# Patient Record
Sex: FEMALE | Race: WHITE | NOT HISPANIC OR LATINO | Employment: FULL TIME | URBAN - METROPOLITAN AREA
[De-identification: names, ages, dates, MRNs, and addresses within clinical notes are randomized per-mention and may not be internally consistent; named-entity substitution may affect disease eponyms.]

---

## 2017-02-21 ENCOUNTER — OFFICE VISIT (OUTPATIENT)
Dept: FAMILY MEDICINE CLINIC | Facility: CLINIC | Age: 47
End: 2017-02-21

## 2017-02-21 VITALS
HEART RATE: 84 BPM | WEIGHT: 293 LBS | TEMPERATURE: 99.1 F | BODY MASS INDEX: 41.95 KG/M2 | DIASTOLIC BLOOD PRESSURE: 62 MMHG | OXYGEN SATURATION: 97 % | SYSTOLIC BLOOD PRESSURE: 120 MMHG | HEIGHT: 70 IN

## 2017-02-21 DIAGNOSIS — E11.9 TYPE 2 DIABETES MELLITUS WITHOUT COMPLICATION, UNSPECIFIED LONG TERM INSULIN USE STATUS: Primary | ICD-10-CM

## 2017-02-21 DIAGNOSIS — E78.49 OTHER HYPERLIPIDEMIA: ICD-10-CM

## 2017-02-21 LAB — HBA1C MFR BLD: 7.5 %

## 2017-02-21 PROCEDURE — 36416 COLLJ CAPILLARY BLOOD SPEC: CPT | Performed by: FAMILY MEDICINE

## 2017-02-21 PROCEDURE — 83036 HEMOGLOBIN GLYCOSYLATED A1C: CPT | Performed by: FAMILY MEDICINE

## 2017-02-21 PROCEDURE — 99213 OFFICE O/P EST LOW 20 MIN: CPT | Performed by: FAMILY MEDICINE

## 2017-02-21 NOTE — PROGRESS NOTES
Subjective   Yu Sheffield is a 46 y.o. female. CC: diabetes    History of Present Illness  Yu is a 46 year old female who comes in today for her diabetes check.  She has not been following the diet until here recently when she decided that she needed to start taking better care of herself.  She feels fine with no complaints of concerns.  Her sugars at home have generally stayed below 200 and are usually around 150.  Her triglycerides were over 600 last time.  She is taking 3 fish oil capsules a day.      The following portions of the patient's history were reviewed and updated as appropriate: allergies, current medications, past medical history, past social history, past surgical history and problem list.    Review of Systems   Constitutional: Negative.  Negative for fatigue and unexpected weight change.   HENT: Negative.  Negative for congestion.    Respiratory: Positive for cough (mostly in the mornings, smoker). Negative for shortness of breath and wheezing.    Cardiovascular: Negative.  Negative for chest pain, palpitations and leg swelling.   Gastrointestinal: Negative.  Negative for abdominal pain, constipation, diarrhea and nausea.   Genitourinary: Negative.  Negative for hematuria.   Musculoskeletal: Negative.  Negative for arthralgias and back pain.        Pain in the right upper back   Skin: Negative.  Negative for rash.   Neurological: Negative.  Negative for dizziness, light-headedness, numbness and headaches.   Psychiatric/Behavioral: Negative.  Negative for dysphoric mood and sleep disturbance. The patient is not nervous/anxious.        Objective   Physical Exam   Constitutional: She appears well-developed and well-nourished.   Neck: Normal range of motion. Neck supple.   Cardiovascular: Normal rate, regular rhythm and normal heart sounds.    No murmur heard.  Pulmonary/Chest: Effort normal and breath sounds normal. No respiratory distress.   Musculoskeletal: She exhibits tenderness (right upper  back just above the bra strap).   Skin: Skin is warm and dry. No rash noted.   Psychiatric: She has a normal mood and affect. Her behavior is normal.   Nursing note and vitals reviewed.       Vitals:    02/21/17 0946   BP: 120/62   Pulse: 84   Temp: 99.1 °F (37.3 °C)   SpO2: 97%     Assessment/Plan   Problems Addressed this Visit        Cardiovascular and Mediastinum    Hyperlipidemia    Relevant Orders    Comprehensive metabolic panel    Lipid panel      Other Visit Diagnoses     Type 2 diabetes mellitus without complication, unspecified long term insulin use status    -  Primary    Relevant Orders    POC Glycosylated Hemoglobin (Hb A1C) (Completed)        No changes in medications at this time.  Recheck in 3 months.       A1C is 7.5.

## 2017-02-22 LAB
ALBUMIN SERPL-MCNC: 4.3 G/DL (ref 3.5–5.5)
ALBUMIN/GLOB SERPL: 1.5 {RATIO} (ref 1.1–2.5)
ALP SERPL-CCNC: 63 IU/L (ref 39–117)
ALT SERPL-CCNC: 20 IU/L (ref 0–32)
AST SERPL-CCNC: 15 IU/L (ref 0–40)
BILIRUB SERPL-MCNC: 0.4 MG/DL (ref 0–1.2)
BUN SERPL-MCNC: 13 MG/DL (ref 6–24)
BUN/CREAT SERPL: 24 (ref 9–23)
CALCIUM SERPL-MCNC: 9.4 MG/DL (ref 8.7–10.2)
CHLORIDE SERPL-SCNC: 99 MMOL/L (ref 96–106)
CHOLEST SERPL-MCNC: 189 MG/DL (ref 100–199)
CO2 SERPL-SCNC: 21 MMOL/L (ref 18–29)
CREAT SERPL-MCNC: 0.54 MG/DL (ref 0.57–1)
GLOBULIN SER CALC-MCNC: 2.9 G/DL (ref 1.5–4.5)
GLUCOSE SERPL-MCNC: 149 MG/DL (ref 65–99)
HDLC SERPL-MCNC: 35 MG/DL
LDLC SERPL CALC-MCNC: 86 MG/DL (ref 0–99)
POTASSIUM SERPL-SCNC: 4.5 MMOL/L (ref 3.5–5.2)
PROT SERPL-MCNC: 7.2 G/DL (ref 6–8.5)
SODIUM SERPL-SCNC: 139 MMOL/L (ref 134–144)
TRIGL SERPL-MCNC: 339 MG/DL (ref 0–149)
VLDLC SERPL CALC-MCNC: 68 MG/DL (ref 5–40)

## 2017-04-24 RX ORDER — PRAVASTATIN SODIUM 40 MG
TABLET ORAL
Qty: 90 TABLET | Refills: 2 | Status: SHIPPED | OUTPATIENT
Start: 2017-04-24 | End: 2018-01-29 | Stop reason: SDUPTHER

## 2017-05-25 ENCOUNTER — OFFICE VISIT (OUTPATIENT)
Dept: FAMILY MEDICINE CLINIC | Facility: CLINIC | Age: 47
End: 2017-05-25

## 2017-05-25 VITALS
OXYGEN SATURATION: 94 % | BODY MASS INDEX: 41.95 KG/M2 | HEIGHT: 70 IN | DIASTOLIC BLOOD PRESSURE: 72 MMHG | WEIGHT: 293 LBS | HEART RATE: 99 BPM | TEMPERATURE: 97.8 F | SYSTOLIC BLOOD PRESSURE: 132 MMHG

## 2017-05-25 DIAGNOSIS — E11.8 TYPE 2 DIABETES MELLITUS WITH COMPLICATION, UNSPECIFIED LONG TERM INSULIN USE STATUS: Primary | ICD-10-CM

## 2017-05-25 LAB — HBA1C MFR BLD: 8.4 %

## 2017-05-25 PROCEDURE — 36416 COLLJ CAPILLARY BLOOD SPEC: CPT | Performed by: FAMILY MEDICINE

## 2017-05-25 PROCEDURE — 83036 HEMOGLOBIN GLYCOSYLATED A1C: CPT | Performed by: FAMILY MEDICINE

## 2017-05-25 PROCEDURE — 99213 OFFICE O/P EST LOW 20 MIN: CPT | Performed by: FAMILY MEDICINE

## 2017-07-25 DIAGNOSIS — N32.81 OAB (OVERACTIVE BLADDER): ICD-10-CM

## 2017-07-25 RX ORDER — SOLIFENACIN SUCCINATE 10 MG/1
TABLET, FILM COATED ORAL
Qty: 90 TABLET | Refills: 3 | Status: SHIPPED | OUTPATIENT
Start: 2017-07-25 | End: 2018-07-13 | Stop reason: SDUPTHER

## 2017-08-22 ENCOUNTER — OFFICE VISIT (OUTPATIENT)
Dept: FAMILY MEDICINE CLINIC | Facility: CLINIC | Age: 47
End: 2017-08-22

## 2017-08-22 VITALS
BODY MASS INDEX: 41.95 KG/M2 | HEART RATE: 96 BPM | DIASTOLIC BLOOD PRESSURE: 70 MMHG | SYSTOLIC BLOOD PRESSURE: 120 MMHG | HEIGHT: 70 IN | WEIGHT: 293 LBS | TEMPERATURE: 98.7 F | OXYGEN SATURATION: 97 %

## 2017-08-22 DIAGNOSIS — E78.49 OTHER HYPERLIPIDEMIA: ICD-10-CM

## 2017-08-22 DIAGNOSIS — IMO0001 UNCONTROLLED TYPE 2 DIABETES MELLITUS WITHOUT COMPLICATION, WITHOUT LONG-TERM CURRENT USE OF INSULIN: Primary | ICD-10-CM

## 2017-08-22 DIAGNOSIS — R30.0 DYSURIA: ICD-10-CM

## 2017-08-22 LAB
BILIRUB BLD-MCNC: NEGATIVE MG/DL
CLARITY, POC: CLEAR
COLOR UR: YELLOW
GLUCOSE UR STRIP-MCNC: ABNORMAL MG/DL
HBA1C MFR BLD: 8.3 %
KETONES UR QL: NEGATIVE
LEUKOCYTE EST, POC: ABNORMAL
NITRITE UR-MCNC: NEGATIVE MG/ML
PH UR: 5.5 [PH] (ref 5–8)
PROT UR STRIP-MCNC: NEGATIVE MG/DL
RBC # UR STRIP: ABNORMAL /UL
SP GR UR: 1.01 (ref 1–1.03)
UROBILINOGEN UR QL: NORMAL

## 2017-08-22 PROCEDURE — 99213 OFFICE O/P EST LOW 20 MIN: CPT | Performed by: FAMILY MEDICINE

## 2017-08-22 PROCEDURE — 81003 URINALYSIS AUTO W/O SCOPE: CPT | Performed by: FAMILY MEDICINE

## 2017-08-22 PROCEDURE — 36416 COLLJ CAPILLARY BLOOD SPEC: CPT | Performed by: FAMILY MEDICINE

## 2017-08-22 PROCEDURE — 83036 HEMOGLOBIN GLYCOSYLATED A1C: CPT | Performed by: FAMILY MEDICINE

## 2017-08-22 NOTE — PROGRESS NOTES
Subjective   Yu Sheffield is a 47 y.o. female. CC: diabetes    History of Present Illness     Yu is a 47 year old female who comes in today for her diabetes check.  She states that her sugars are still running high but generally stay in the range of 170-180.  Occasionally over 200.  She has lost 5 lbs since her last office visit.  She also needs to have her cholesterol checked today.  Blood pressure has been stable.  Complains of mild dysuria as well as frequency of urine.      The following portions of the patient's history were reviewed and updated as appropriate: allergies, current medications, past medical history, past social history, past surgical history and problem list.    Review of Systems   Constitutional: Negative.  Negative for fatigue and unexpected weight change.   HENT: Negative.  Negative for congestion.    Respiratory: Negative.  Negative for cough, shortness of breath and wheezing.    Gastrointestinal: Negative.  Negative for abdominal pain, constipation, diarrhea and nausea.   Genitourinary: Positive for dysuria and frequency.   Musculoskeletal: Negative.  Negative for arthralgias and back pain.   Skin: Negative.  Negative for rash.   Neurological: Negative.  Negative for dizziness, light-headedness and headaches.   Psychiatric/Behavioral: Negative.  Negative for dysphoric mood and sleep disturbance. The patient is not nervous/anxious.        Objective   Physical Exam   Constitutional: She is oriented to person, place, and time. She appears well-developed and well-nourished.   Cardiovascular: Normal rate, regular rhythm and normal heart sounds.    No murmur heard.  Pulmonary/Chest: Effort normal and breath sounds normal. No respiratory distress.   Neurological: She is alert and oriented to person, place, and time.   Skin: Skin is warm and dry.   Psychiatric: She has a normal mood and affect. Her behavior is normal.   Nursing note and vitals reviewed.    Vitals:    08/22/17 0931   BP: 120/70    Pulse: 96   Temp: 98.7 °F (37.1 °C)   SpO2: 97%     Assessment/Plan   Problems Addressed this Visit        Cardiovascular and Mediastinum    Hyperlipidemia       Endocrine    Uncontrolled type 2 diabetes mellitus without complication, without long-term current use of insulin - Primary    Relevant Medications    Liraglutide 18 MG/3ML solution pen-injector    Insulin Pen Needle 29G X 12MM misc    Other Relevant Orders    POC Glycosylated Hemoglobin (Hb A1C) (Completed)    MicroAlbumin, Urine, Random    Comprehensive metabolic panel    Lipid panel      Other Visit Diagnoses     Dysuria        Relevant Orders    POCT urinalysis dipstick, automated (Completed)    Urine culture (clean catch)            A1C is 8.3.  Started on Victoza and instructed her on the use of the pen.

## 2017-08-24 LAB
ALBUMIN SERPL-MCNC: 4.7 G/DL (ref 3.5–5.2)
ALBUMIN/GLOB SERPL: 1.4 G/DL
ALP SERPL-CCNC: 66 U/L (ref 39–117)
ALT SERPL-CCNC: 32 U/L (ref 1–33)
AST SERPL-CCNC: 22 U/L (ref 1–32)
BACTERIA UR CULT: NORMAL
BACTERIA UR CULT: NORMAL
BILIRUB SERPL-MCNC: 0.4 MG/DL (ref 0.1–1.2)
BUN SERPL-MCNC: 13 MG/DL (ref 6–20)
BUN/CREAT SERPL: 20.6 (ref 7–25)
CALCIUM SERPL-MCNC: 9.9 MG/DL (ref 8.6–10.5)
CHLORIDE SERPL-SCNC: 99 MMOL/L (ref 98–107)
CHOLEST SERPL-MCNC: 183 MG/DL (ref 0–200)
CO2 SERPL-SCNC: 24.3 MMOL/L (ref 22–29)
CREAT SERPL-MCNC: 0.63 MG/DL (ref 0.57–1)
GLOBULIN SER CALC-MCNC: 3.3 GM/DL
GLUCOSE SERPL-MCNC: 158 MG/DL (ref 65–99)
HDLC SERPL-MCNC: 31 MG/DL (ref 40–60)
LDLC SERPL CALC-MCNC: ABNORMAL MG/DL
MICROALBUMIN UR-MCNC: 23.9 UG/ML
POTASSIUM SERPL-SCNC: 4.5 MMOL/L (ref 3.5–5.2)
PROT SERPL-MCNC: 8 G/DL (ref 6–8.5)
SODIUM SERPL-SCNC: 141 MMOL/L (ref 136–145)
TRIGL SERPL-MCNC: 512 MG/DL (ref 0–150)
VLDLC SERPL CALC-MCNC: ABNORMAL MG/DL

## 2017-10-04 DIAGNOSIS — IMO0001 UNCONTROLLED TYPE 2 DIABETES MELLITUS WITHOUT COMPLICATION, WITHOUT LONG-TERM CURRENT USE OF INSULIN: ICD-10-CM

## 2017-10-04 RX ORDER — DAPAGLIFLOZIN 10 MG/1
TABLET, FILM COATED ORAL
Qty: 90 TABLET | Refills: 3 | Status: SHIPPED | OUTPATIENT
Start: 2017-10-04 | End: 2018-09-25 | Stop reason: SDUPTHER

## 2017-11-21 ENCOUNTER — OFFICE VISIT (OUTPATIENT)
Dept: FAMILY MEDICINE CLINIC | Facility: CLINIC | Age: 47
End: 2017-11-21

## 2017-11-21 VITALS
DIASTOLIC BLOOD PRESSURE: 60 MMHG | TEMPERATURE: 99.5 F | HEART RATE: 82 BPM | BODY MASS INDEX: 41.4 KG/M2 | WEIGHT: 289.2 LBS | OXYGEN SATURATION: 97 % | HEIGHT: 70 IN | SYSTOLIC BLOOD PRESSURE: 115 MMHG

## 2017-11-21 DIAGNOSIS — E78.49 OTHER HYPERLIPIDEMIA: ICD-10-CM

## 2017-11-21 DIAGNOSIS — IMO0001 UNCONTROLLED TYPE 2 DIABETES MELLITUS WITHOUT COMPLICATION, WITHOUT LONG-TERM CURRENT USE OF INSULIN: Primary | ICD-10-CM

## 2017-11-21 LAB
ALBUMIN SERPL-MCNC: 4.4 G/DL (ref 3.5–5.2)
ALBUMIN/GLOB SERPL: 1.4 G/DL
ALP SERPL-CCNC: 61 U/L (ref 39–117)
ALT SERPL-CCNC: 24 U/L (ref 1–33)
AST SERPL-CCNC: 15 U/L (ref 1–32)
BILIRUB SERPL-MCNC: 0.3 MG/DL (ref 0.1–1.2)
BUN SERPL-MCNC: 13 MG/DL (ref 6–20)
BUN/CREAT SERPL: 22.4 (ref 7–25)
CALCIUM SERPL-MCNC: 9.5 MG/DL (ref 8.6–10.5)
CHLORIDE SERPL-SCNC: 102 MMOL/L (ref 98–107)
CHOLEST SERPL-MCNC: 183 MG/DL (ref 0–200)
CO2 SERPL-SCNC: 23.8 MMOL/L (ref 22–29)
CREAT SERPL-MCNC: 0.58 MG/DL (ref 0.57–1)
GFR SERPLBLD CREATININE-BSD FMLA CKD-EPI: 111 ML/MIN/1.73
GFR SERPLBLD CREATININE-BSD FMLA CKD-EPI: 134 ML/MIN/1.73
GLOBULIN SER CALC-MCNC: 3.1 GM/DL
GLUCOSE SERPL-MCNC: 124 MG/DL (ref 65–99)
HBA1C MFR BLD: 7.6 %
HDLC SERPL-MCNC: 32 MG/DL (ref 40–60)
LDLC SERPL CALC-MCNC: 89 MG/DL (ref 0–100)
POTASSIUM SERPL-SCNC: 4.4 MMOL/L (ref 3.5–5.2)
PROT SERPL-MCNC: 7.5 G/DL (ref 6–8.5)
SODIUM SERPL-SCNC: 139 MMOL/L (ref 136–145)
TRIGL SERPL-MCNC: 308 MG/DL (ref 0–150)
VLDLC SERPL CALC-MCNC: 61.6 MG/DL (ref 5–40)

## 2017-11-21 PROCEDURE — 83036 HEMOGLOBIN GLYCOSYLATED A1C: CPT | Performed by: FAMILY MEDICINE

## 2017-11-21 PROCEDURE — 36416 COLLJ CAPILLARY BLOOD SPEC: CPT | Performed by: FAMILY MEDICINE

## 2017-11-21 PROCEDURE — 99213 OFFICE O/P EST LOW 20 MIN: CPT | Performed by: FAMILY MEDICINE

## 2017-11-21 RX ORDER — PEN NEEDLE, DIABETIC 29 G X1/2"
NEEDLE, DISPOSABLE MISCELLANEOUS
COMMUNITY
Start: 2017-11-03 | End: 2018-08-07 | Stop reason: SDUPTHER

## 2017-11-21 NOTE — PROGRESS NOTES
Subjective   Yu Sheffield is a 47 y.o. female. CC: diabetes    History of Present Illness   Yu is a 47 year old female who comes in today for her diabetes check.  She states that her sugars are starting to improve with the addition of Victoza.  She is also losing weight since starting on it. She is very happy about that.  Her sugars are generally staying below 200 now.  She is also due to have her cholesterol checked---has hyperlipidemia.  Needs a flu shot but we are out of them today.      The following portions of the patient's history were reviewed and updated as appropriate: allergies, current medications, past medical history, past social history, past surgical history and problem list.    Review of Systems   Constitutional: Negative.  Negative for fatigue and unexpected weight change.   HENT: Negative.  Negative for congestion.    Respiratory: Negative.  Negative for cough, shortness of breath and wheezing.    Cardiovascular: Negative.  Negative for chest pain, palpitations and leg swelling.   Gastrointestinal: Negative.  Negative for abdominal pain, diarrhea and nausea.   Genitourinary: Negative.  Negative for difficulty urinating.   Musculoskeletal: Negative.  Negative for arthralgias and back pain.   Skin: Negative.  Negative for rash.   Neurological: Negative.  Negative for dizziness, light-headedness and headaches.   Psychiatric/Behavioral: Negative.  Negative for dysphoric mood and sleep disturbance. The patient is not nervous/anxious.        Objective   Physical Exam   Constitutional: She is oriented to person, place, and time. She appears well-developed and well-nourished.   Neck: Normal range of motion. Neck supple.   Cardiovascular: Normal rate, regular rhythm and normal heart sounds.    Pulmonary/Chest: Effort normal and breath sounds normal.   Neurological: She is alert and oriented to person, place, and time.   Skin: Skin is warm and dry.   Psychiatric: She has a normal mood and affect. Her  "behavior is normal.   Nursing note and vitals reviewed.    Vitals:    11/21/17 0947   BP: 115/60   Pulse: 82   Temp: 99.5 °F (37.5 °C)   SpO2: 97%   Weight: 289 lb 3.2 oz (131 kg)   Height: 70\" (177.8 cm)     Assessment/Plan   Problems Addressed this Visit        Cardiovascular and Mediastinum    Hyperlipidemia    Relevant Orders    Comprehensive metabolic panel    Lipid panel       Endocrine    Uncontrolled type 2 diabetes mellitus without complication, without long-term current use of insulin - Primary            A1C 7.6.  No change in medication.  She is going to work very hard on her diet and see if she can continue to improve.    Follow up with Dr. Garcia in 3 months.     "

## 2018-01-29 RX ORDER — PRAVASTATIN SODIUM 40 MG
40 TABLET ORAL DAILY
Qty: 90 TABLET | Refills: 2 | Status: SHIPPED | OUTPATIENT
Start: 2018-01-29 | End: 2018-09-25 | Stop reason: SDUPTHER

## 2018-02-27 ENCOUNTER — OFFICE VISIT (OUTPATIENT)
Dept: FAMILY MEDICINE CLINIC | Facility: CLINIC | Age: 48
End: 2018-02-27

## 2018-02-27 VITALS
SYSTOLIC BLOOD PRESSURE: 142 MMHG | BODY MASS INDEX: 41.66 KG/M2 | DIASTOLIC BLOOD PRESSURE: 80 MMHG | HEIGHT: 70 IN | OXYGEN SATURATION: 99 % | WEIGHT: 291 LBS | HEART RATE: 108 BPM

## 2018-02-27 DIAGNOSIS — IMO0001 UNCONTROLLED TYPE 2 DIABETES MELLITUS WITHOUT COMPLICATION, WITHOUT LONG-TERM CURRENT USE OF INSULIN: Primary | ICD-10-CM

## 2018-02-27 DIAGNOSIS — E78.49 OTHER HYPERLIPIDEMIA: ICD-10-CM

## 2018-02-27 DIAGNOSIS — Z23 ENCOUNTER FOR IMMUNIZATION: ICD-10-CM

## 2018-02-27 LAB — HBA1C MFR BLD: 7.6 %

## 2018-02-27 PROCEDURE — 90471 IMMUNIZATION ADMIN: CPT | Performed by: FAMILY MEDICINE

## 2018-02-27 PROCEDURE — 90670 PCV13 VACCINE IM: CPT | Performed by: FAMILY MEDICINE

## 2018-02-27 PROCEDURE — 99214 OFFICE O/P EST MOD 30 MIN: CPT | Performed by: FAMILY MEDICINE

## 2018-02-27 PROCEDURE — 83036 HEMOGLOBIN GLYCOSYLATED A1C: CPT | Performed by: FAMILY MEDICINE

## 2018-02-27 RX ORDER — LISINOPRIL 5 MG/1
5 TABLET ORAL DAILY
Qty: 90 TABLET | Refills: 1 | Status: SHIPPED | OUTPATIENT
Start: 2018-02-27 | End: 2018-08-06 | Stop reason: SDUPTHER

## 2018-02-27 NOTE — PROGRESS NOTES
"Subjective   Yu Sheffield is a 47 y.o. female.  Previously seen in the office by Dr. Salguero.    Chief Complaint   Patient presents with   • Establish Care     diabetes        History of Present Illness    Pt is new to me.  Problems are new to me.  Diabetes F/U    Dietary Habits: in general, an \"unhealthy\" diet, fast food 3 times weekly, breakfast and lunch, drinking soda again, coffee, tea  Diabetic Medications: metformin 500 mg two BID, liraglutide 1.2 mg once daily, farxiga 10 mg daily  BG levels: AM Fasting 150-170, has been in the 200s  Neuropathy: yes, toes have recently been a problem, feel so cold, worse at night  DM symptoms: Polyuria and polydipsia  Foot exam: performed 2/2016, has been to podiatry 3 times in the spring 2017 did shots for bone spur which helped, has a sore on her left foot, during the summer it was fine but now not doing well.  Eye exam: done, 1/26/18 with Jordyn Anthony, ophthomology in [facility], said was good. Will have pt sign to request records.  Pneumovax: has not had, counseled and would like to start series today  Triglycerides in November 2017 total cholesterol 183 triglycerides 308 down from 512 HDL 32 LDL 89.  Last A1c was 7.9%.  Her CMP is otherwise in what mildly elevated blood glucoses have been within normal limits including creatinine and GFR.    The following portions of the patient's history were reviewed and updated as appropriate: allergies, current medications and problem list.    Review of Systems   Constitutional: Negative for activity change and unexpected weight change.   Eyes: Negative for visual disturbance.   Endocrine: Positive for polydipsia and polyuria.       Objective   Blood pressure 142/80, pulse 108, height 177.8 cm (70\"), weight 132 kg (291 lb), SpO2 99 %, not currently breastfeeding.  Physical Exam   Constitutional: She is oriented to person, place, and time.   Eyes: Conjunctivae are normal. No scleral icterus.   Pulmonary/Chest: Effort normal. No respiratory " distress.   Abdominal: Soft. She exhibits no distension.   Musculoskeletal: She exhibits edema.   Trace pitting edema on pretibial surface.    Yu had a diabetic foot exam performed today.   During the foot exam she had a monofilament test performed.    Vascular Status -  Her exam exhibits right foot vasculature normal. Her exam exhibits no right foot edema. Her exam exhibits left foot vasculature normal. Her exam exhibits no left foot edema.   Skin Integrity  -  Her right foot skin is intact.  She has callous right foot, right heel is dry and cracked and right foot warmth.  She has no right foot onychomycosis, no right foot ulcer,  no ingrown toenail on right foot and no right foot blister.    Yu 's left foot skin is intact (medial heel with abrasion, no increased erythema, swelling, drainage, eschar and sloughing, about 2.5 cm in diameter). She has left foot ulcer, callous left foot, left heel dry and cracked and left foot warmth. She has no left foot onychomycosis, no left ingrown toenail and no left foot blister..   Foot Structure and Biomechanics -  Her right foot has no hallux valgus and no hammer toes present. Her left foot has no hallux valgus and no hammer toes.      Neurological: She is alert and oriented to person, place, and time.   Skin:   No firm areas at injection sites   Psychiatric: She has a normal mood and affect. Her behavior is normal.       Assessment/Plan   Yu was seen today for establish care.    Diagnoses and all orders for this visit:    Uncontrolled type 2 diabetes mellitus without complication, without long-term current use of insulin  -     POC Glycosylated Hemoglobin (Hb A1C)    Other hyperlipidemia    Other orders  -     lisinopril (PRINIVIL,ZESTRIL) 5 MG tablet; Take 1 tablet by mouth Daily.    counseled on benefits and risks of ACE-I therapy. Pt to call if having any new symptoms, go to ED if angioedema.    Given handout on diabetes and dining out. Discussed increasing  physical activity, colleagues have started walking at work, she has done that with them before needs to start. Better urinary control with BG control.     Follow up in 3 months

## 2018-05-23 ENCOUNTER — OFFICE VISIT (OUTPATIENT)
Dept: FAMILY MEDICINE CLINIC | Facility: CLINIC | Age: 48
End: 2018-05-23

## 2018-05-23 VITALS
OXYGEN SATURATION: 98 % | WEIGHT: 286 LBS | HEART RATE: 91 BPM | HEIGHT: 70 IN | BODY MASS INDEX: 40.94 KG/M2 | SYSTOLIC BLOOD PRESSURE: 120 MMHG | DIASTOLIC BLOOD PRESSURE: 82 MMHG

## 2018-05-23 DIAGNOSIS — IMO0001 UNCONTROLLED TYPE 2 DIABETES MELLITUS WITHOUT COMPLICATION, WITHOUT LONG-TERM CURRENT USE OF INSULIN: Primary | ICD-10-CM

## 2018-05-23 DIAGNOSIS — E78.49 OTHER HYPERLIPIDEMIA: ICD-10-CM

## 2018-05-23 LAB — HBA1C MFR BLD: 7.5 %

## 2018-05-23 PROCEDURE — 99214 OFFICE O/P EST MOD 30 MIN: CPT | Performed by: FAMILY MEDICINE

## 2018-05-23 PROCEDURE — 83036 HEMOGLOBIN GLYCOSYLATED A1C: CPT | Performed by: FAMILY MEDICINE

## 2018-07-13 DIAGNOSIS — N32.81 OAB (OVERACTIVE BLADDER): ICD-10-CM

## 2018-07-13 RX ORDER — SOLIFENACIN SUCCINATE 10 MG/1
10 TABLET, FILM COATED ORAL DAILY
Qty: 90 TABLET | Refills: 1 | Status: SHIPPED | OUTPATIENT
Start: 2018-07-13 | End: 2018-12-27 | Stop reason: SDUPTHER

## 2018-08-06 RX ORDER — LISINOPRIL 5 MG/1
TABLET ORAL
Qty: 90 TABLET | Refills: 1 | Status: SHIPPED | OUTPATIENT
Start: 2018-08-06 | End: 2019-08-15 | Stop reason: SDUPTHER

## 2018-08-07 DIAGNOSIS — IMO0001 UNCONTROLLED TYPE 2 DIABETES MELLITUS WITHOUT COMPLICATION, WITHOUT LONG-TERM CURRENT USE OF INSULIN: ICD-10-CM

## 2018-08-07 RX ORDER — PEN NEEDLE, DIABETIC 29 G X1/2"
NEEDLE, DISPOSABLE MISCELLANEOUS
Qty: 100 EACH | Refills: 1 | Status: SHIPPED | OUTPATIENT
Start: 2018-08-07 | End: 2018-08-10 | Stop reason: SDUPTHER

## 2018-08-10 DIAGNOSIS — IMO0001 UNCONTROLLED TYPE 2 DIABETES MELLITUS WITHOUT COMPLICATION, WITHOUT LONG-TERM CURRENT USE OF INSULIN: ICD-10-CM

## 2018-08-10 RX ORDER — PEN NEEDLE, DIABETIC 29 G X1/2"
NEEDLE, DISPOSABLE MISCELLANEOUS
Qty: 100 EACH | Refills: 1 | Status: SHIPPED | OUTPATIENT
Start: 2018-08-10 | End: 2020-12-17 | Stop reason: SDUPTHER

## 2018-08-28 ENCOUNTER — OFFICE VISIT (OUTPATIENT)
Dept: FAMILY MEDICINE CLINIC | Facility: CLINIC | Age: 48
End: 2018-08-28

## 2018-08-28 VITALS
WEIGHT: 280 LBS | SYSTOLIC BLOOD PRESSURE: 132 MMHG | HEART RATE: 88 BPM | OXYGEN SATURATION: 99 % | DIASTOLIC BLOOD PRESSURE: 78 MMHG | BODY MASS INDEX: 40.09 KG/M2 | HEIGHT: 70 IN

## 2018-08-28 DIAGNOSIS — I10 ESSENTIAL HYPERTENSION: ICD-10-CM

## 2018-08-28 DIAGNOSIS — IMO0001 UNCONTROLLED TYPE 2 DIABETES MELLITUS WITHOUT COMPLICATION, WITHOUT LONG-TERM CURRENT USE OF INSULIN: Primary | ICD-10-CM

## 2018-08-28 LAB — HBA1C MFR BLD: 7.1 %

## 2018-08-28 PROCEDURE — 83036 HEMOGLOBIN GLYCOSYLATED A1C: CPT | Performed by: FAMILY MEDICINE

## 2018-08-28 PROCEDURE — 99214 OFFICE O/P EST MOD 30 MIN: CPT | Performed by: FAMILY MEDICINE

## 2018-08-28 NOTE — PROGRESS NOTES
"Esdras Sheffield is a 48 y.o. female.     Chief Complaint   Patient presents with   • Diabetes        History of Present Illness    DM follow up  Overall she has fairly good control with last A1c 5/20/18 of 7.5%.  She takes metformin 500 mg tablets and react to lose a.  She is also on Farxiga.  Patient is taking a statin and an ACE inhibitor.  Still eating out a lot. Most often goes to [employer]. She does take her lunch to work but now she is losing her job. Taking sandwich and chicken salad. She will still have insurance. BG in am has been more in the 150s sometimes up to 180. She does not have any hypoglycemia. She had her PCV 13 and diabetic foot exam February of this year. She does not have any sores on her feet or concern about her feet this time.  Eye exams are done in January to Dr. Jordyn Anthony, in [facility] [place] office.     The following portions of the patient's history were reviewed and updated as appropriate: allergies, current medications and problem list.    Review of Systems   Constitutional: Negative for activity change, appetite change, diaphoresis and unexpected weight change.   Respiratory: Negative for shortness of breath.    Cardiovascular: Negative for chest pain and leg swelling.   Endocrine: Positive for polydipsia and polyuria.   Skin: Negative for wound.   Neurological: Negative for light-headedness.       Objective   Blood pressure 132/78, pulse 88, height 177.8 cm (70\"), weight 127 kg (280 lb), SpO2 99 %, not currently breastfeeding.  Physical Exam   Constitutional: She is oriented to person, place, and time. She appears well-nourished. No distress.   Eyes: Conjunctivae are normal. Right eye exhibits no discharge. Left eye exhibits no discharge. No scleral icterus.   Cardiovascular: Normal rate, regular rhythm, normal heart sounds and intact distal pulses.  Exam reveals no gallop and no friction rub.    No murmur heard.  Pulmonary/Chest: Effort normal and breath sounds " normal. No respiratory distress. She has no wheezes.   Musculoskeletal:   Wearing compression socks   Neurological: She is alert and oriented to person, place, and time.   Psychiatric: She has a normal mood and affect. Her behavior is normal.   Vitals reviewed.      Assessment/Plan   Yu was seen today for diabetes.    Diagnoses and all orders for this visit:    Uncontrolled type 2 diabetes mellitus without complication, without long-term current use of insulin (CMS/[facility])  -     POC Glycosylated Hemoglobin (Hb A1C)    Essential hypertension      Both well controlled. Some improvement in A1c today. Taking her lunch encouraged to continue. Going to start exercising when job is done. Continue current meds as is. We will call for eye exam results.

## 2018-09-25 DIAGNOSIS — IMO0001 UNCONTROLLED TYPE 2 DIABETES MELLITUS WITHOUT COMPLICATION, WITHOUT LONG-TERM CURRENT USE OF INSULIN: ICD-10-CM

## 2018-09-25 RX ORDER — PRAVASTATIN SODIUM 40 MG
TABLET ORAL
Qty: 90 TABLET | Refills: 1 | Status: SHIPPED | OUTPATIENT
Start: 2018-09-25 | End: 2020-03-30

## 2018-11-19 ENCOUNTER — OFFICE VISIT (OUTPATIENT)
Dept: FAMILY MEDICINE CLINIC | Facility: CLINIC | Age: 48
End: 2018-11-19

## 2018-11-19 VITALS
SYSTOLIC BLOOD PRESSURE: 132 MMHG | WEIGHT: 272 LBS | OXYGEN SATURATION: 99 % | DIASTOLIC BLOOD PRESSURE: 72 MMHG | HEIGHT: 70 IN | BODY MASS INDEX: 38.94 KG/M2 | HEART RATE: 88 BPM

## 2018-11-19 DIAGNOSIS — E11.65 TYPE 2 DIABETES MELLITUS WITH HYPERGLYCEMIA, WITHOUT LONG-TERM CURRENT USE OF INSULIN (HCC): Primary | ICD-10-CM

## 2018-11-19 DIAGNOSIS — R09.81 NASAL CONGESTION: ICD-10-CM

## 2018-11-19 DIAGNOSIS — Z23 NEED FOR PNEUMOCOCCAL VACCINE: ICD-10-CM

## 2018-11-19 DIAGNOSIS — H61.23 CERUMEN DEBRIS ON TYMPANIC MEMBRANE OF BOTH EARS: ICD-10-CM

## 2018-11-19 DIAGNOSIS — E78.49 OTHER HYPERLIPIDEMIA: ICD-10-CM

## 2018-11-19 DIAGNOSIS — I10 ESSENTIAL HYPERTENSION: ICD-10-CM

## 2018-11-19 DIAGNOSIS — Z23 NEED FOR IMMUNIZATION AGAINST INFLUENZA: ICD-10-CM

## 2018-11-19 LAB
ALBUMIN SERPL-MCNC: 4.6 G/DL (ref 3.5–5.2)
ALBUMIN/GLOB SERPL: 1.4 G/DL
ALP SERPL-CCNC: 69 U/L (ref 39–117)
ALT SERPL-CCNC: 25 U/L (ref 1–33)
AST SERPL-CCNC: 15 U/L (ref 1–32)
BILIRUB SERPL-MCNC: 0.3 MG/DL (ref 0.1–1.2)
BUN SERPL-MCNC: 10 MG/DL (ref 6–20)
BUN/CREAT SERPL: 15.4 (ref 7–25)
CALCIUM SERPL-MCNC: 9.9 MG/DL (ref 8.6–10.5)
CHLORIDE SERPL-SCNC: 98 MMOL/L (ref 98–107)
CHOLEST SERPL-MCNC: 155 MG/DL (ref 0–200)
CO2 SERPL-SCNC: 26 MMOL/L (ref 22–29)
CREAT SERPL-MCNC: 0.65 MG/DL (ref 0.57–1)
GLOBULIN SER CALC-MCNC: 3.4 GM/DL
GLUCOSE SERPL-MCNC: 124 MG/DL (ref 65–99)
HBA1C MFR BLD: 6.98 % (ref 4.8–5.6)
HDLC SERPL-MCNC: 35 MG/DL (ref 40–60)
LDLC SERPL CALC-MCNC: 76 MG/DL (ref 0–100)
POTASSIUM SERPL-SCNC: 4.7 MMOL/L (ref 3.5–5.2)
PROT SERPL-MCNC: 8 G/DL (ref 6–8.5)
SODIUM SERPL-SCNC: 140 MMOL/L (ref 136–145)
TRIGL SERPL-MCNC: 220 MG/DL (ref 0–150)
VLDLC SERPL CALC-MCNC: 44 MG/DL (ref 5–40)

## 2018-11-19 PROCEDURE — 90674 CCIIV4 VAC NO PRSV 0.5 ML IM: CPT | Performed by: FAMILY MEDICINE

## 2018-11-19 PROCEDURE — 90471 IMMUNIZATION ADMIN: CPT | Performed by: FAMILY MEDICINE

## 2018-11-19 PROCEDURE — 99214 OFFICE O/P EST MOD 30 MIN: CPT | Performed by: FAMILY MEDICINE

## 2018-11-19 PROCEDURE — 90472 IMMUNIZATION ADMIN EACH ADD: CPT | Performed by: FAMILY MEDICINE

## 2018-11-19 PROCEDURE — 90732 PPSV23 VACC 2 YRS+ SUBQ/IM: CPT | Performed by: FAMILY MEDICINE

## 2018-11-19 NOTE — PROGRESS NOTES
Subjective    Chief Complaint   Patient presents with   • Diabetes   • Immunizations     flu        Yu Sheffield is a 48 y.o. female.     History of Present Illness   DM  Still making lunch more. Has lost 20 lbs since 2/2018. Has not increased physical activity. Have been running 150 in AM fasting. Lowest 136 and felt good. She takes faxiga, victoza, metformin. Started on victoza last summer. Has eye exam 1/2019. Foot exam due 2/2019, needs flu shot. PPSV next visit.     Congestion  This is the 4th day. She is using day quil and night quil, still lot of congestion. Going to use her neti pot. Trouble breathing through her nose. No ear pain no fever.       The following portions of the patient's history were reviewed and updated as appropriate: allergies, current medications and problem list.    Review of Systems   Constitutional: Negative.    HENT: Positive for congestion.    Eyes: Negative.    Respiratory: Negative.    Cardiovascular: Negative.    Gastrointestinal: Negative.    Endocrine: Negative.    Genitourinary: Negative.    Musculoskeletal: Negative.    Skin: Negative.    Allergic/Immunologic: Negative.    Hematological: Negative.    Psychiatric/Behavioral: Negative.        Objective    Vitals:    11/19/18 0942   BP: 132/72   Pulse: 88   SpO2: 99%       Physical Exam   Constitutional: She is oriented to person, place, and time. She appears well-nourished. No distress.   HENT:   Right Ear: External ear normal.   Left Ear: External ear normal.   Nose: Nose normal.   Mouth/Throat: No oropharyngeal exudate.   Cerumen occlusion bilaterally. Erythema of the posterior oropharynx     Eyes: Conjunctivae are normal. Right eye exhibits no discharge. Left eye exhibits no discharge. No scleral icterus.   Neck: Neck supple. No thyromegaly present.   Cardiovascular: Normal rate, regular rhythm, normal heart sounds and intact distal pulses.   No murmur heard.  Pulmonary/Chest: Effort normal and breath sounds normal. No  respiratory distress. She has no wheezes.   Musculoskeletal: She exhibits no edema.   Lymphadenopathy:     She has no cervical adenopathy.   Neurological: She is alert and oriented to person, place, and time. She exhibits normal muscle tone.   Psychiatric: She has a normal mood and affect. Her behavior is normal.   Vitals reviewed.      Assessment/Plan   Yu was seen today for diabetes and immunizations.    Diagnoses and all orders for this visit:    Type 2 diabetes mellitus with hyperglycemia, without long-term current use of insulin (CMS/[facility])  -     Comprehensive Metabolic Panel  -     Hemoglobin A1c    Need for immunization against influenza  -     Fluzone High Dose =>65Years; Standing  -     Fluzone High Dose =>65Years    Need for pneumococcal vaccine  -     Pneumococcal polysaccharide vaccine 23-valent >= 1yo subcutaneous/IM (PPSV23)    Other hyperlipidemia  -     Lipid Panel    Essential hypertension  -     Comprehensive Metabolic Panel    Cerumen debris on tympanic membrane of both ears    Nasal congestion      A1c has been in the 7s%. She is continuing to work on her diet and lose weight. We will check in on the labs today. No medication changes at this time. Encouraged to continue the diet changes. Really doing well with weight loss. Encouraged to work in exercise to her weekly routine. She voiced understanding and still planning on this 1/2019 when her job ends and she is looking for a new one. She needs PNA23 2/2 diabetes, no 13, has not been one year but will give this today as it is due related to DM.     BP controlled fairly well, at goal for DM, continue current meds.    Due to check in on lipids at one year. Has had good control with the statin. Ensure stability but anticipate better with her weight loss.     Nasal congestion recommended she use nasal saline washing or her neti pot. Also can use mucinex. If no relief from the day-night-quil would recommend dimetapp. If she is not finding  improvement or having worsening over the next week she should call the office.     Madiha  Recommended oil drops and flushes with warm water.

## 2018-12-27 DIAGNOSIS — N32.81 OAB (OVERACTIVE BLADDER): ICD-10-CM

## 2018-12-27 RX ORDER — SOLIFENACIN SUCCINATE 10 MG/1
TABLET, FILM COATED ORAL
Qty: 90 TABLET | Refills: 1 | Status: SHIPPED | OUTPATIENT
Start: 2018-12-27 | End: 2019-08-15 | Stop reason: CLARIF

## 2019-02-11 RX ORDER — LISINOPRIL 5 MG/1
TABLET ORAL
Qty: 90 TABLET | Refills: 1 | Status: SHIPPED | OUTPATIENT
Start: 2019-02-11 | End: 2019-02-20 | Stop reason: SDUPTHER

## 2019-02-20 ENCOUNTER — OFFICE VISIT (OUTPATIENT)
Dept: FAMILY MEDICINE CLINIC | Facility: CLINIC | Age: 49
End: 2019-02-20

## 2019-02-20 VITALS
RESPIRATION RATE: 19 BRPM | HEART RATE: 92 BPM | HEIGHT: 70 IN | OXYGEN SATURATION: 97 % | WEIGHT: 279.8 LBS | DIASTOLIC BLOOD PRESSURE: 72 MMHG | BODY MASS INDEX: 40.06 KG/M2 | SYSTOLIC BLOOD PRESSURE: 134 MMHG

## 2019-02-20 DIAGNOSIS — I10 ESSENTIAL HYPERTENSION: ICD-10-CM

## 2019-02-20 DIAGNOSIS — E11.65 TYPE 2 DIABETES MELLITUS WITH HYPERGLYCEMIA, WITHOUT LONG-TERM CURRENT USE OF INSULIN (HCC): Primary | ICD-10-CM

## 2019-02-20 DIAGNOSIS — E78.49 OTHER HYPERLIPIDEMIA: ICD-10-CM

## 2019-02-20 PROCEDURE — 99214 OFFICE O/P EST MOD 30 MIN: CPT | Performed by: FAMILY MEDICINE

## 2019-02-20 NOTE — PROGRESS NOTES
"Chief Complaint   Patient presents with   • Diabetes     f/u home monitoring 170-180 range on BS       Subjective   Yu Sheffield is a 48 y.o. female.     History of Present Illness   DM  Her BG is 160-180s. Winter is harder. She is still taking her lunch. Has not started exercise. Still working.   [id number] Dr[facility] for eye exam at the beginning of the month said it was good. UTD on vaccines.    HTN  Fairly well controlled.  She is on an ACE inhibitor.  She does not exercise.    HLD  Patient's total cholesterol and LDL are well controlled.  However he she has low HDL and high triglycerides.  She takes 4000 mg of fish oil a day.  She is doing well to continue to take her lunch to work and not eat from restaurants all day.  She does not exercise.    Tobacco abuse counseling  Patient smokes a pack a day.  She quit for 10 months but then started back up when things were stressful.  She would like to quit but has no plans for that right now.    The following portions of the patient's history were reviewed and updated as appropriate: allergies, current medications, past medical history, past social history and problem list.    Review of Systems   Respiratory: Negative.    Endocrine: Negative.    Genitourinary: Negative.    Neurological: Negative.    Psychiatric/Behavioral: Negative.        Vitals:    02/20/19 0852   BP: 134/72   BP Location: Left arm   Patient Position: Sitting   Cuff Size: Adult   Pulse: 92   Resp: 19   SpO2: 97%   Weight: 127 kg (279 lb 12.8 oz)   Height: 177.8 cm (70\")       Objective   Physical Exam   Constitutional: She is oriented to person, place, and time. She appears well-nourished. No distress.   Eyes: Conjunctivae are normal. Right eye exhibits no discharge. Left eye exhibits no discharge. No scleral icterus.   Cardiovascular: Normal rate, regular rhythm, normal heart sounds and intact distal pulses. Exam reveals no gallop and no friction rub.   No murmur heard.  Pulmonary/Chest: " Effort normal and breath sounds normal. No respiratory distress. She has no wheezes.   Musculoskeletal: She exhibits no edema.    Yu had a diabetic foot exam performed today.   During the foot exam she had a monofilament test performed.  Vascular Status -  Her right foot exhibits abnormal foot vasculature . Her right foot exhibits no edema. Her left foot exhibits abnormal foot vasculature . Her left foot exhibits no edema.  Skin Integrity  -  Her right foot skin is not intact. She has callous right foot and right foot warmth.  She has no right foot onychomycosis, no right foot ulcer, no ingrown toenail on right foot, right heel is not dry and cracked and no right foot blister.  Her left foot skin is not intact.She has callous left foot and left foot warmth. She has no left foot onychomycosis, no left foot ulcer, no left ingrown toenail, no left heel dry and cracked and no left foot blister..   Foot Structure and Biomechanics -  Her right foot has no hallux valgus and no hammer toes present. Her left foot has no hallux valgus and no hammer toes.  Neurological: She is alert and oriented to person, place, and time.   Psychiatric: She has a normal mood and affect. Her behavior is normal.   Vitals reviewed.      Assessment/Plan   Yu was seen today for diabetes.    Diagnoses and all orders for this visit:    Type 2 diabetes mellitus with hyperglycemia, without long-term current use of insulin (CMS/[facility])  -     Hemoglobin A1c  -     Basic Metabolic Panel  Has been fairly well controlled with her medications and diet changes.  Encouraged her to continue to work on the diet and incorporate physical activity regularly.  Essential hypertension  -     CBC & Differential  -     Basic Metabolic Panel    Other hyperlipidemia  Discussed exercise.

## 2019-02-25 LAB
BASOPHILS # BLD AUTO: 0.07 10*3/MM3 (ref 0–0.2)
BASOPHILS NFR BLD AUTO: 0.7 % (ref 0–1.5)
BUN SERPL-MCNC: 15 MG/DL (ref 6–20)
BUN/CREAT SERPL: 24.6 (ref 7–25)
CALCIUM SERPL-MCNC: 10.4 MG/DL (ref 8.6–10.5)
CHLORIDE SERPL-SCNC: 103 MMOL/L (ref 98–107)
CO2 SERPL-SCNC: 23.9 MMOL/L (ref 22–29)
CREAT SERPL-MCNC: 0.61 MG/DL (ref 0.57–1)
EOSINOPHIL # BLD AUTO: 0.53 10*3/MM3 (ref 0–0.4)
EOSINOPHIL NFR BLD AUTO: 5.3 % (ref 0.3–6.2)
ERYTHROCYTE [DISTWIDTH] IN BLOOD BY AUTOMATED COUNT: 14.8 % (ref 12.3–15.4)
GLUCOSE SERPL-MCNC: 155 MG/DL (ref 65–99)
HBA1C MFR BLD: 7.3 % (ref 4.8–5.6)
HCT VFR BLD AUTO: 49.8 % (ref 34–46.6)
HGB BLD-MCNC: 16 G/DL (ref 12–15.9)
IMM GRANULOCYTES # BLD AUTO: 0.04 10*3/MM3 (ref 0–0.05)
IMM GRANULOCYTES NFR BLD AUTO: 0.4 % (ref 0–0.5)
LYMPHOCYTES # BLD AUTO: 2.39 10*3/MM3 (ref 0.7–3.1)
LYMPHOCYTES NFR BLD AUTO: 24.1 % (ref 19.6–45.3)
MCH RBC QN AUTO: 33.5 PG (ref 26.6–33)
MCHC RBC AUTO-ENTMCNC: 32.1 G/DL (ref 31.5–35.7)
MCV RBC AUTO: 104.2 FL (ref 79–97)
MONOCYTES # BLD AUTO: 0.64 10*3/MM3 (ref 0.1–0.9)
MONOCYTES NFR BLD AUTO: 6.4 % (ref 5–12)
NEUTROPHILS # BLD AUTO: 6.26 10*3/MM3 (ref 1.4–7)
NEUTROPHILS NFR BLD AUTO: 63.1 % (ref 42.7–76)
NRBC BLD AUTO-RTO: 0 /100 WBC (ref 0–0)
PLATELET # BLD AUTO: 246 10*3/MM3 (ref 140–450)
POTASSIUM SERPL-SCNC: 4.5 MMOL/L (ref 3.5–5.2)
RBC # BLD AUTO: 4.78 10*6/MM3 (ref 3.77–5.28)
SODIUM SERPL-SCNC: 139 MMOL/L (ref 136–145)
WBC # BLD AUTO: 9.93 10*3/MM3 (ref 3.4–10.8)

## 2019-03-18 DIAGNOSIS — IMO0001 UNCONTROLLED TYPE 2 DIABETES MELLITUS WITHOUT COMPLICATION, WITHOUT LONG-TERM CURRENT USE OF INSULIN: ICD-10-CM

## 2019-03-18 RX ORDER — DAPAGLIFLOZIN 10 MG/1
TABLET, FILM COATED ORAL
Qty: 90 TABLET | Refills: 0 | Status: SHIPPED | OUTPATIENT
Start: 2019-03-18 | End: 2019-12-02 | Stop reason: SDUPTHER

## 2019-04-15 DIAGNOSIS — IMO0001 UNCONTROLLED TYPE 2 DIABETES MELLITUS WITHOUT COMPLICATION, WITHOUT LONG-TERM CURRENT USE OF INSULIN: ICD-10-CM

## 2019-04-15 RX ORDER — PRAVASTATIN SODIUM 40 MG
TABLET ORAL
Qty: 90 TABLET | Refills: 1 | Status: SHIPPED | OUTPATIENT
Start: 2019-04-15 | End: 2019-05-24 | Stop reason: SDUPTHER

## 2019-05-24 ENCOUNTER — OFFICE VISIT (OUTPATIENT)
Dept: FAMILY MEDICINE CLINIC | Facility: CLINIC | Age: 49
End: 2019-05-24

## 2019-05-24 VITALS
TEMPERATURE: 98.9 F | SYSTOLIC BLOOD PRESSURE: 122 MMHG | OXYGEN SATURATION: 96 % | DIASTOLIC BLOOD PRESSURE: 72 MMHG | BODY MASS INDEX: 40.27 KG/M2 | WEIGHT: 281.3 LBS | HEART RATE: 93 BPM | RESPIRATION RATE: 18 BRPM | HEIGHT: 70 IN

## 2019-05-24 DIAGNOSIS — Z12.39 BREAST CANCER SCREENING: ICD-10-CM

## 2019-05-24 DIAGNOSIS — Z12.4 CERVICAL CANCER SCREENING: ICD-10-CM

## 2019-05-24 DIAGNOSIS — E11.65 TYPE 2 DIABETES MELLITUS WITH HYPERGLYCEMIA, WITHOUT LONG-TERM CURRENT USE OF INSULIN (HCC): ICD-10-CM

## 2019-05-24 DIAGNOSIS — Z00.00 ANNUAL PHYSICAL EXAM: Primary | ICD-10-CM

## 2019-05-24 PROCEDURE — 99396 PREV VISIT EST AGE 40-64: CPT | Performed by: FAMILY MEDICINE

## 2019-05-24 NOTE — PROGRESS NOTES
"Chief Complaint   Patient presents with   • Annual Exam   • Gynecologic Exam       Subjective   Yu Sheffield is a 48 y.o. female.     History of Present Illness   Colonoscopy she has never had one  Mammogram used to have them regular with the [facility] for screening. We think the last one was 2013. She does self exams she has no concerns.  Diet no special diet.  She does continue to take her lunches to work and prepare food.  Her sugars not tested in the last 1 week because her dad had pneumonia and was having some elevated sugars so she used hers strips and testing supplies to test his blood sugar.  Prior to that high she is always 180 usually lower than that.  Exercise she has not incorporated exercise into her regular schedule.  Bone density her LMP was year ago. No bleeding, no d/c. She does have vaginal itching. Wakes in the middle of the night with that. More so on the inner thigh. She is not sexually active.     The following portions of the patient's history were reviewed and updated as appropriate: allergies, current medications, past family history, past medical history, past social history, past surgical history and problem list.    Review of Systems   Constitutional: Negative for activity change, appetite change and unexpected weight change.   Respiratory: Negative for shortness of breath.    Cardiovascular: Negative for chest pain and leg swelling.   Gastrointestinal: Negative for blood in stool, constipation and diarrhea.       Vitals:    05/24/19 1108   BP: 122/72   BP Location: Left arm   Patient Position: Sitting   Cuff Size: Adult   Pulse: 93   Resp: 18   Temp: 98.9 °F (37.2 °C)   TempSrc: Oral   SpO2: 96%   Weight: 128 kg (281 lb 4.8 oz)   Height: 177.8 cm (70\")       Objective   Physical Exam   Constitutional: She is oriented to person, place, and time. She appears well-nourished. No distress.   HENT:   Right Ear: External ear normal.   Left Ear: External ear normal.   Nose: Nose " normal.   Mouth/Throat: Oropharynx is clear and moist. No oropharyngeal exudate.   Bilateral ear canals and tympanic membranes appear normal.   Eyes: Conjunctivae and EOM are normal. Right eye exhibits no discharge. Left eye exhibits no discharge. No scleral icterus.   Neck: Neck supple. No thyromegaly present.   Cardiovascular: Normal rate, regular rhythm, normal heart sounds and intact distal pulses. Exam reveals no gallop and no friction rub.   No murmur heard.  Pulmonary/Chest: Effort normal and breath sounds normal. No respiratory distress. She has no wheezes. She has no rales. She exhibits no tenderness. Right breast exhibits no inverted nipple, no mass, no nipple discharge, no skin change and no tenderness. Left breast exhibits no inverted nipple, no mass, no nipple discharge, no skin change and no tenderness.       Abdominal: Soft. Bowel sounds are normal. She exhibits no distension and no mass. There is no tenderness. There is no guarding.   Genitourinary: Vagina normal. No vaginal discharge found.   Genitourinary Comments: Cervix visualized but also was faced laterally to the patient's left.  Difficult to get into view and sample.  Related to body habitus the bimanual exam was less reliable but she did not have any tenderness or palpated masses or bulkiness.   Musculoskeletal: She exhibits no edema or deformity.   Lymphadenopathy:     She has no cervical adenopathy.   Neurological: She is alert and oriented to person, place, and time. She exhibits normal muscle tone. Coordination normal.   Skin: Skin is warm and dry.   Psychiatric: She has a normal mood and affect. Her behavior is normal.   Vitals reviewed.  This was my first breast exam on the patient.  On her right breast most medial margin near the sternum there were 2 palpable firm masses.  The most central one was a little bit smaller about 1 cm in the 1 lateral to that was a little bit lower and incorporated like to masses and was more like 2  raul.    Assessment/Plan   Yu was seen today for annual exam and gynecologic exam.    Diagnoses and all orders for this visit:    Annual physical exam  -     IGP, cobasHPV, rfx16/18    Cervical cancer screening  -     IGP, cobasHPV, rfx16/18  -     Pap IG, HPV-hr; Future    Type 2 diabetes mellitus with hyperglycemia, without long-term current use of insulin (CMS/[facility])  -     Hemoglobin A1c  -     Comprehensive Metabolic Panel  -     Lipid Panel  -     MicroAlbumin, Urine, Random - Urine, Clean Catch    Breast cancer screening    Other orders  -     Cancel: CBC & Differential    Patient is due for annual exam and needs to go to the dentist which she says she will schedule.  She also needs a mammogram which she wants to do with the [facility].  I offered to set up a mammogram for her but she reported she did not want me to she was going to do it herself.  The palpable masses on the right breast were reported by patient to have been there for years and unchanged.  I encouraged her to get the mammogram because she is 6 years behind schedule and be good to take a look.  Patient voiced understanding and should she will do this.  Concern for Pap smear sampling to be more vaginal than cervical given the difficulty of the exam and the positioning of the cervix.

## 2019-05-25 LAB
ALBUMIN SERPL-MCNC: 4.6 G/DL (ref 3.5–5.2)
ALBUMIN/GLOB SERPL: 1.4 G/DL
ALP SERPL-CCNC: 67 U/L (ref 39–117)
ALT SERPL-CCNC: 20 U/L (ref 1–33)
AST SERPL-CCNC: 14 U/L (ref 1–32)
BILIRUB SERPL-MCNC: 0.5 MG/DL (ref 0.2–1.2)
BUN SERPL-MCNC: 12 MG/DL (ref 6–20)
BUN/CREAT SERPL: 21.4 (ref 7–25)
CALCIUM SERPL-MCNC: 10 MG/DL (ref 8.6–10.5)
CHLORIDE SERPL-SCNC: 99 MMOL/L (ref 98–107)
CHOLEST SERPL-MCNC: 179 MG/DL (ref 0–200)
CO2 SERPL-SCNC: 22.9 MMOL/L (ref 22–29)
CREAT SERPL-MCNC: 0.56 MG/DL (ref 0.57–1)
GLOBULIN SER CALC-MCNC: 3.2 GM/DL
GLUCOSE SERPL-MCNC: 118 MG/DL (ref 65–99)
HBA1C MFR BLD: 7.1 % (ref 4.8–5.6)
HDLC SERPL-MCNC: 38 MG/DL (ref 40–60)
LDLC SERPL CALC-MCNC: 71 MG/DL (ref 0–100)
MICROALBUMIN UR-MCNC: 4.2 UG/ML
POTASSIUM SERPL-SCNC: 4.5 MMOL/L (ref 3.5–5.2)
PROT SERPL-MCNC: 7.8 G/DL (ref 6–8.5)
SODIUM SERPL-SCNC: 140 MMOL/L (ref 136–145)
TRIGL SERPL-MCNC: 350 MG/DL (ref 0–150)
VLDLC SERPL CALC-MCNC: 70 MG/DL

## 2019-05-29 LAB
CYTOLOGIST CVX/VAG CYTO: NORMAL
CYTOLOGY CVX/VAG DOC CYTO: NORMAL
CYTOLOGY CVX/VAG DOC THIN PREP: NORMAL
DX ICD CODE: NORMAL
HIV 1 & 2 AB SER-IMP: NORMAL
HPV I/H RISK 4 DNA CVX QL PROBE+SIG AMP: NEGATIVE
OTHER STN SPEC: NORMAL
STAT OF ADQ CVX/VAG CYTO-IMP: NORMAL

## 2019-06-10 DIAGNOSIS — IMO0001 UNCONTROLLED TYPE 2 DIABETES MELLITUS WITHOUT COMPLICATION, WITHOUT LONG-TERM CURRENT USE OF INSULIN: ICD-10-CM

## 2019-06-10 RX ORDER — DAPAGLIFLOZIN 10 MG/1
TABLET, FILM COATED ORAL
Qty: 90 TABLET | Refills: 0 | Status: SHIPPED | OUTPATIENT
Start: 2019-06-10 | End: 2019-08-15 | Stop reason: SDUPTHER

## 2019-06-28 DIAGNOSIS — N32.81 OAB (OVERACTIVE BLADDER): ICD-10-CM

## 2019-06-28 RX ORDER — SOLIFENACIN SUCCINATE 10 MG/1
TABLET, FILM COATED ORAL
Qty: 90 TABLET | Refills: 1 | Status: SHIPPED | OUTPATIENT
Start: 2019-06-28 | End: 2019-08-15 | Stop reason: CLARIF

## 2019-07-22 RX ORDER — LISINOPRIL 5 MG/1
TABLET ORAL
Qty: 90 TABLET | Refills: 1 | Status: SHIPPED | OUTPATIENT
Start: 2019-07-22 | End: 2020-01-27

## 2019-07-25 DIAGNOSIS — IMO0001 UNCONTROLLED TYPE 2 DIABETES MELLITUS WITHOUT COMPLICATION, WITHOUT LONG-TERM CURRENT USE OF INSULIN: ICD-10-CM

## 2019-08-15 ENCOUNTER — RESULTS ENCOUNTER (OUTPATIENT)
Dept: FAMILY MEDICINE CLINIC | Facility: CLINIC | Age: 49
End: 2019-08-15

## 2019-08-15 ENCOUNTER — OFFICE VISIT (OUTPATIENT)
Dept: FAMILY MEDICINE CLINIC | Facility: CLINIC | Age: 49
End: 2019-08-15

## 2019-08-15 VITALS
OXYGEN SATURATION: 97 % | WEIGHT: 282.4 LBS | BODY MASS INDEX: 40.43 KG/M2 | RESPIRATION RATE: 16 BRPM | TEMPERATURE: 98.8 F | DIASTOLIC BLOOD PRESSURE: 72 MMHG | SYSTOLIC BLOOD PRESSURE: 126 MMHG | HEIGHT: 70 IN | HEART RATE: 96 BPM

## 2019-08-15 DIAGNOSIS — Z12.11 COLON CANCER SCREENING: ICD-10-CM

## 2019-08-15 DIAGNOSIS — I10 ESSENTIAL HYPERTENSION: ICD-10-CM

## 2019-08-15 DIAGNOSIS — E11.65 TYPE 2 DIABETES MELLITUS WITH HYPERGLYCEMIA, WITHOUT LONG-TERM CURRENT USE OF INSULIN (HCC): Primary | ICD-10-CM

## 2019-08-15 PROCEDURE — 99213 OFFICE O/P EST LOW 20 MIN: CPT | Performed by: FAMILY MEDICINE

## 2019-08-15 RX ORDER — DICLOFENAC SODIUM 75 MG/1
75 TABLET, DELAYED RELEASE ORAL 2 TIMES DAILY
COMMUNITY
Start: 2019-08-13

## 2019-08-15 RX ORDER — SOLIFENACIN SUCCINATE 10 MG/1
TABLET, FILM COATED ORAL
COMMUNITY
Start: 2019-06-28 | End: 2019-12-23

## 2019-08-16 LAB
BUN SERPL-MCNC: 16 MG/DL (ref 6–20)
BUN/CREAT SERPL: 26.2 (ref 7–25)
CALCIUM SERPL-MCNC: 9.5 MG/DL (ref 8.6–10.5)
CHLORIDE SERPL-SCNC: 100 MMOL/L (ref 98–107)
CO2 SERPL-SCNC: 26.4 MMOL/L (ref 22–29)
CREAT SERPL-MCNC: 0.61 MG/DL (ref 0.57–1)
GLUCOSE SERPL-MCNC: 136 MG/DL (ref 65–99)
HBA1C MFR BLD: 7.1 % (ref 4.8–5.6)
POTASSIUM SERPL-SCNC: 5.2 MMOL/L (ref 3.5–5.2)
SODIUM SERPL-SCNC: 140 MMOL/L (ref 136–145)

## 2019-09-09 DIAGNOSIS — IMO0001 UNCONTROLLED TYPE 2 DIABETES MELLITUS WITHOUT COMPLICATION, WITHOUT LONG-TERM CURRENT USE OF INSULIN: ICD-10-CM

## 2019-09-09 RX ORDER — PRAVASTATIN SODIUM 40 MG
TABLET ORAL
Qty: 90 TABLET | Refills: 1 | Status: SHIPPED | OUTPATIENT
Start: 2019-09-09 | End: 2019-12-02 | Stop reason: SDUPTHER

## 2019-09-09 RX ORDER — DAPAGLIFLOZIN 10 MG/1
TABLET, FILM COATED ORAL
Qty: 90 TABLET | Refills: 0 | Status: SHIPPED | OUTPATIENT
Start: 2019-09-09 | End: 2019-12-02 | Stop reason: SDUPTHER

## 2019-12-02 ENCOUNTER — OFFICE VISIT (OUTPATIENT)
Dept: FAMILY MEDICINE CLINIC | Facility: CLINIC | Age: 49
End: 2019-12-02

## 2019-12-02 VITALS
HEIGHT: 70 IN | BODY MASS INDEX: 39.71 KG/M2 | HEART RATE: 93 BPM | DIASTOLIC BLOOD PRESSURE: 68 MMHG | WEIGHT: 277.4 LBS | RESPIRATION RATE: 16 BRPM | SYSTOLIC BLOOD PRESSURE: 116 MMHG | OXYGEN SATURATION: 98 % | TEMPERATURE: 98.9 F

## 2019-12-02 DIAGNOSIS — E11.65 TYPE 2 DIABETES MELLITUS WITH HYPERGLYCEMIA, WITHOUT LONG-TERM CURRENT USE OF INSULIN (HCC): Primary | ICD-10-CM

## 2019-12-02 DIAGNOSIS — I10 ESSENTIAL HYPERTENSION: ICD-10-CM

## 2019-12-02 PROCEDURE — 90471 IMMUNIZATION ADMIN: CPT | Performed by: FAMILY MEDICINE

## 2019-12-02 PROCEDURE — 90674 CCIIV4 VAC NO PRSV 0.5 ML IM: CPT | Performed by: FAMILY MEDICINE

## 2019-12-02 PROCEDURE — 99213 OFFICE O/P EST LOW 20 MIN: CPT | Performed by: FAMILY MEDICINE

## 2019-12-02 RX ORDER — ALBUTEROL SULFATE 90 UG/1
AEROSOL, METERED RESPIRATORY (INHALATION)
COMMUNITY
Start: 2019-09-05 | End: 2020-03-06

## 2019-12-02 NOTE — PROGRESS NOTES
"Chief Complaint   Patient presents with   • Diabetes   • Flu Vaccine       Subjective   Yu Sheffield is a 49 y.o. female.     History of Present Illness   Last A1c was 7.1% 8/2019.  meds taking faxiga, metformin  Morning fasting 140-150 usually. No hypoglycemia.   She is walking a lot when she is traveling. Not as much now.   Her eye exam is in 1/2020. She has in 1/2019 from Dr. Araya.   She is taking her lunch instead of getting restaurant food.     The following portions of the patient's history were reviewed and updated as appropriate: allergies, current medications, past medical history, past social history and problem list.    Review of Systems   Respiratory: Negative.    Cardiovascular: Negative.    Endocrine: Negative.    Neurological: Negative.        /68 (BP Location: Left arm, Patient Position: Sitting, Cuff Size: Adult)   Pulse 93   Temp 98.9 °F (37.2 °C) (Oral)   Resp 16   Ht 177.8 cm (70\")   Wt 126 kg (277 lb 6.4 oz)   LMP  (LMP Unknown)   SpO2 98%   Breastfeeding? No   BMI 39.80 kg/m²       Objective   Physical Exam   Constitutional: She is oriented to person, place, and time. She appears well-nourished. No distress.   Eyes: Conjunctivae are normal. Right eye exhibits no discharge. Left eye exhibits no discharge. No scleral icterus.   Cardiovascular: Normal rate, regular rhythm, normal heart sounds and intact distal pulses. Exam reveals no gallop and no friction rub.   No murmur heard.  Pulmonary/Chest: Effort normal and breath sounds normal. No respiratory distress. She has no wheezes.   Musculoskeletal: She exhibits no edema.   Neurological: She is alert and oriented to person, place, and time.   Psychiatric: She has a normal mood and affect. Her behavior is normal.   Vitals reviewed.      Assessment/Plan   Yu was seen today for diabetes and flu vaccine.    Diagnoses and all orders for this visit:    Type 2 diabetes mellitus with hyperglycemia, without long-term current use of " insulin (CMS/[facility])  -     Dapagliflozin Propanediol (FARXIGA) 10 MG tablet; Take 10 mg by mouth Daily.  -     Comprehensive Metabolic Panel  -     CBC & Differential  -     Hemoglobin A1c    Essential hypertension  -     CBC & Differential    Other orders  -     Fluarix/Fluzone/Afluria Quad>6 Months      She has lost weight.   Blood pressure good.   We talked about her smoking but she says she likes it and she is not ready to quit.     Continue food prep  Labs today  No med changes until lab review.

## 2019-12-03 LAB
ALBUMIN SERPL-MCNC: 4.6 G/DL (ref 3.5–5.2)
ALBUMIN/GLOB SERPL: 1.7 G/DL
ALP SERPL-CCNC: 58 U/L (ref 39–117)
ALT SERPL-CCNC: 22 U/L (ref 1–33)
AST SERPL-CCNC: 11 U/L (ref 1–32)
BASOPHILS # BLD AUTO: 0.08 10*3/MM3 (ref 0–0.2)
BASOPHILS NFR BLD AUTO: 0.9 % (ref 0–1.5)
BILIRUB SERPL-MCNC: 0.5 MG/DL (ref 0.2–1.2)
BUN SERPL-MCNC: 16 MG/DL (ref 6–20)
BUN/CREAT SERPL: 23.2 (ref 7–25)
CALCIUM SERPL-MCNC: 9.3 MG/DL (ref 8.6–10.5)
CHLORIDE SERPL-SCNC: 101 MMOL/L (ref 98–107)
CO2 SERPL-SCNC: 23.6 MMOL/L (ref 22–29)
CREAT SERPL-MCNC: 0.69 MG/DL (ref 0.57–1)
EOSINOPHIL # BLD AUTO: 0.71 10*3/MM3 (ref 0–0.4)
EOSINOPHIL NFR BLD AUTO: 8 % (ref 0.3–6.2)
ERYTHROCYTE [DISTWIDTH] IN BLOOD BY AUTOMATED COUNT: 14.6 % (ref 12.3–15.4)
GLOBULIN SER CALC-MCNC: 2.7 GM/DL
GLUCOSE SERPL-MCNC: 114 MG/DL (ref 65–99)
HBA1C MFR BLD: 6.8 % (ref 4.8–5.6)
HCT VFR BLD AUTO: 44.5 % (ref 34–46.6)
HGB BLD-MCNC: 15.1 G/DL (ref 12–15.9)
IMM GRANULOCYTES # BLD AUTO: 0.02 10*3/MM3 (ref 0–0.05)
IMM GRANULOCYTES NFR BLD AUTO: 0.2 % (ref 0–0.5)
LYMPHOCYTES # BLD AUTO: 2.6 10*3/MM3 (ref 0.7–3.1)
LYMPHOCYTES NFR BLD AUTO: 29.1 % (ref 19.6–45.3)
MCH RBC QN AUTO: 33.9 PG (ref 26.6–33)
MCHC RBC AUTO-ENTMCNC: 33.9 G/DL (ref 31.5–35.7)
MCV RBC AUTO: 99.8 FL (ref 79–97)
MONOCYTES # BLD AUTO: 0.55 10*3/MM3 (ref 0.1–0.9)
MONOCYTES NFR BLD AUTO: 6.2 % (ref 5–12)
NEUTROPHILS # BLD AUTO: 4.96 10*3/MM3 (ref 1.7–7)
NEUTROPHILS NFR BLD AUTO: 55.6 % (ref 42.7–76)
NRBC BLD AUTO-RTO: 0 /100 WBC (ref 0–0.2)
PLATELET # BLD AUTO: 253 10*3/MM3 (ref 140–450)
POTASSIUM SERPL-SCNC: 4.9 MMOL/L (ref 3.5–5.2)
PROT SERPL-MCNC: 7.3 G/DL (ref 6–8.5)
RBC # BLD AUTO: 4.46 10*6/MM3 (ref 3.77–5.28)
SODIUM SERPL-SCNC: 139 MMOL/L (ref 136–145)
WBC # BLD AUTO: 8.92 10*3/MM3 (ref 3.4–10.8)

## 2019-12-23 RX ORDER — SOLIFENACIN SUCCINATE 10 MG/1
TABLET, FILM COATED ORAL
Qty: 90 TABLET | Refills: 1 | Status: SHIPPED | OUTPATIENT
Start: 2019-12-23 | End: 2020-06-13

## 2020-01-27 RX ORDER — LISINOPRIL 5 MG/1
TABLET ORAL
Qty: 90 TABLET | Refills: 1 | Status: SHIPPED | OUTPATIENT
Start: 2020-01-27 | End: 2020-07-27

## 2020-03-06 ENCOUNTER — OFFICE VISIT (OUTPATIENT)
Dept: FAMILY MEDICINE CLINIC | Facility: CLINIC | Age: 50
End: 2020-03-06

## 2020-03-06 VITALS
HEART RATE: 99 BPM | HEIGHT: 70 IN | OXYGEN SATURATION: 98 % | TEMPERATURE: 98.4 F | RESPIRATION RATE: 18 BRPM | BODY MASS INDEX: 39.34 KG/M2 | SYSTOLIC BLOOD PRESSURE: 128 MMHG | WEIGHT: 274.8 LBS | DIASTOLIC BLOOD PRESSURE: 72 MMHG

## 2020-03-06 DIAGNOSIS — E11.65 TYPE 2 DIABETES MELLITUS WITH HYPERGLYCEMIA, WITHOUT LONG-TERM CURRENT USE OF INSULIN (HCC): Primary | ICD-10-CM

## 2020-03-06 DIAGNOSIS — E78.49 OTHER HYPERLIPIDEMIA: ICD-10-CM

## 2020-03-06 DIAGNOSIS — I10 ESSENTIAL HYPERTENSION: ICD-10-CM

## 2020-03-06 PROCEDURE — 99214 OFFICE O/P EST MOD 30 MIN: CPT | Performed by: FAMILY MEDICINE

## 2020-03-06 NOTE — PROGRESS NOTES
"Chief Complaint   Patient presents with   • Diabetes     3 mth follow    • Med Management     DM foot screen        Subjective   Yu Sheffield is a 49 y.o. female.     History of Present Illness   DM  Sugars says they are about the same 150-160.  She is on a few meds. Taking 100 mg of metformin BID, victoza  Daily and farxiga. She needs a refill of the farxiga and says she has much worse insurance this year so she is concerned how much this is going to cost.  Due for foot screen.   She did received her PPSV 23 in 11/2018  She had her eye exam last month    HTN  Looks great on ACE inhibitor. Just low dose.   She does not exercise but prepares her own meals including lunch.     HLD  On a statin, low potency. She is due for checking in on labs.     Has not yet gotten her mammogram.   She is still smoking.     The following portions of the patient's history were reviewed and updated as appropriate: allergies, current medications, past medical history, past social history and problem list.    Review of Systems   Respiratory: Negative.    Cardiovascular: Negative.    Neurological: Positive for headaches.        State is occurring more frequently    said years ago she had migraines and had not had any in long time. Had one this last month and now little headaches she takes excedrin or excedrin migraine, helps a lot. She does not use the medication every day. She just uses advil for regular headache. She does this infrequently, twice per month. No sinus pressure or allergy stuff. Says that weather changes are triggers for her migraines in addition to her cycle.     /72 (BP Location: Left arm, Patient Position: Sitting, Cuff Size: Adult)   Pulse 99   Temp 98.4 °F (36.9 °C) (Oral)   Resp 18   Ht 177.8 cm (70\")   Wt 125 kg (274 lb 12.8 oz)   LMP  (LMP Unknown)   SpO2 98%   Breastfeeding No   BMI 39.43 kg/m²       Objective   Physical Exam   Constitutional: She is oriented to person, place, and time. She appears " well-nourished. No distress.   HENT:   Right Ear: External ear normal.   Left Ear: External ear normal.   Nose: Nose normal.   Mouth/Throat: Oropharynx is clear and moist.   TMs and canals are clear, normal.   Eyes: Conjunctivae are normal. Right eye exhibits no discharge. Left eye exhibits no discharge. No scleral icterus.   Neck: Neck supple. No thyromegaly present.   Cardiovascular: Normal rate, regular rhythm, normal heart sounds and intact distal pulses.   No murmur heard.  Pulmonary/Chest: Effort normal and breath sounds normal. No respiratory distress. She has no wheezes.   Musculoskeletal: She exhibits no edema.    Yu had a diabetic foot exam performed today.   During the foot exam she had a monofilament test performed.  Vascular Status -  Her right foot exhibits normal foot vasculature  and no edema. Her left foot exhibits normal foot vasculature  and no edema.  Skin Integrity  -  Her right foot skin is intact. She has callous right foot and right heel is dry and cracked.  She has no right foot onychomycosis, no right foot ulcer, no ingrown toenail on right foot, no right foot warmth and no right foot blister.Her left foot skin is intact.She has callous left foot and left heel dry and cracked. She has no left foot onychomycosis, no left foot ulcer, no left ingrown toenail, no left foot warmth and no left foot blister..   Foot Structure and Biomechanics -  Her right foot has no hallux valgus and no hammer toes present. Her left foot has no hallux valgus and no hammer toes.  Lymphadenopathy:     She has no cervical adenopathy.   Neurological: She is alert and oriented to person, place, and time. She exhibits normal muscle tone.   Psychiatric: She has a normal mood and affect. Her behavior is normal.   Vitals reviewed.      Assessment/Plan   Yu was seen today for diabetes and med management.    Diagnoses and all orders for this visit:    Type 2 diabetes mellitus with hyperglycemia, without long-term  current use of insulin (CMS/[facility])  -     Dapagliflozin Propanediol (FARXIGA) 10 MG tablet; Take 10 mg by mouth Daily.  -     Hemoglobin A1c  -     Basic Metabolic Panel    Essential hypertension  -     Basic Metabolic Panel    Other hyperlipidemia  -     Lipid Panel      Encouraged for regular exercise, continue home meals and avoid fast food.   Given two weeks worth of farxiga. May need an alternative if this is too expensive. Due for labs today.   Foot exam done today. Discussed her dry skin, has scratch from her cat that is less than a week old and looks to be healing well.

## 2020-03-09 LAB
BUN SERPL-MCNC: 18 MG/DL (ref 6–20)
BUN/CREAT SERPL: 25.7 (ref 7–25)
CALCIUM SERPL-MCNC: 9.6 MG/DL (ref 8.6–10.5)
CHLORIDE SERPL-SCNC: 100 MMOL/L (ref 98–107)
CHOLEST SERPL-MCNC: 234 MG/DL (ref 0–200)
CO2 SERPL-SCNC: 23.1 MMOL/L (ref 22–29)
CREAT SERPL-MCNC: 0.7 MG/DL (ref 0.57–1)
GLUCOSE SERPL-MCNC: 145 MG/DL (ref 65–99)
HBA1C MFR BLD: 7.3 % (ref 4.8–5.6)
HDLC SERPL-MCNC: 30 MG/DL (ref 40–60)
LDLC SERPL CALC-MCNC: ABNORMAL MG/DL
POTASSIUM SERPL-SCNC: 4.9 MMOL/L (ref 3.5–5.2)
SODIUM SERPL-SCNC: 138 MMOL/L (ref 136–145)
TRIGL SERPL-MCNC: 720 MG/DL (ref 0–150)
VLDLC SERPL CALC-MCNC: ABNORMAL MG/DL

## 2020-03-10 RX ORDER — ICOSAPENT ETHYL 1000 MG/1
2 CAPSULE ORAL 2 TIMES DAILY WITH MEALS
Qty: 360 CAPSULE | Refills: 1 | Status: SHIPPED | OUTPATIENT
Start: 2020-03-10 | End: 2020-12-16 | Stop reason: SDUPTHER

## 2020-03-29 DIAGNOSIS — IMO0001 UNCONTROLLED TYPE 2 DIABETES MELLITUS WITHOUT COMPLICATION, WITHOUT LONG-TERM CURRENT USE OF INSULIN: ICD-10-CM

## 2020-03-30 RX ORDER — PRAVASTATIN SODIUM 40 MG
TABLET ORAL
Qty: 90 TABLET | Refills: 1 | Status: SHIPPED | OUTPATIENT
Start: 2020-03-30 | End: 2020-12-17 | Stop reason: SDUPTHER

## 2020-06-13 RX ORDER — SOLIFENACIN SUCCINATE 10 MG/1
TABLET, FILM COATED ORAL
Qty: 90 TABLET | Refills: 1 | Status: SHIPPED | OUTPATIENT
Start: 2020-06-13 | End: 2020-12-16 | Stop reason: SDUPTHER

## 2020-07-27 RX ORDER — LISINOPRIL 5 MG/1
TABLET ORAL
Qty: 90 TABLET | Refills: 1 | Status: SHIPPED | OUTPATIENT
Start: 2020-07-27 | End: 2020-12-16 | Stop reason: SDUPTHER

## 2020-09-15 RX ORDER — PRAVASTATIN SODIUM 40 MG
TABLET ORAL
Qty: 90 TABLET | Refills: 1 | OUTPATIENT
Start: 2020-09-15

## 2020-10-07 DIAGNOSIS — E11.65 TYPE 2 DIABETES MELLITUS WITH HYPERGLYCEMIA, WITHOUT LONG-TERM CURRENT USE OF INSULIN (HCC): ICD-10-CM

## 2020-10-07 RX ORDER — DAPAGLIFLOZIN 10 MG/1
TABLET, FILM COATED ORAL
Qty: 30 TABLET | Refills: 0 | Status: SHIPPED | OUTPATIENT
Start: 2020-10-07 | End: 2020-10-27

## 2020-10-24 DIAGNOSIS — E11.65 TYPE 2 DIABETES MELLITUS WITH HYPERGLYCEMIA, WITHOUT LONG-TERM CURRENT USE OF INSULIN (HCC): ICD-10-CM

## 2020-10-27 RX ORDER — DAPAGLIFLOZIN 10 MG/1
TABLET, FILM COATED ORAL
Qty: 30 TABLET | Refills: 0 | Status: SHIPPED | OUTPATIENT
Start: 2020-10-27 | End: 2020-12-16 | Stop reason: SDUPTHER

## 2020-11-16 DIAGNOSIS — E11.65 TYPE 2 DIABETES MELLITUS WITH HYPERGLYCEMIA, WITHOUT LONG-TERM CURRENT USE OF INSULIN (HCC): ICD-10-CM

## 2020-11-17 RX ORDER — DAPAGLIFLOZIN 10 MG/1
TABLET, FILM COATED ORAL
Qty: 30 TABLET | Refills: 0 | OUTPATIENT
Start: 2020-11-17

## 2020-12-08 RX ORDER — SOLIFENACIN SUCCINATE 10 MG/1
TABLET, FILM COATED ORAL
Qty: 90 TABLET | Refills: 1 | OUTPATIENT
Start: 2020-12-08

## 2020-12-14 ENCOUNTER — OFFICE VISIT (OUTPATIENT)
Dept: FAMILY MEDICINE CLINIC | Facility: CLINIC | Age: 50
End: 2020-12-14

## 2020-12-14 VITALS
DIASTOLIC BLOOD PRESSURE: 68 MMHG | SYSTOLIC BLOOD PRESSURE: 118 MMHG | OXYGEN SATURATION: 98 % | HEIGHT: 70 IN | TEMPERATURE: 97.5 F | BODY MASS INDEX: 39.21 KG/M2 | WEIGHT: 273.9 LBS | HEART RATE: 89 BPM

## 2020-12-14 DIAGNOSIS — Z23 NEED FOR IMMUNIZATION AGAINST INFLUENZA: ICD-10-CM

## 2020-12-14 DIAGNOSIS — I10 ESSENTIAL HYPERTENSION: ICD-10-CM

## 2020-12-14 DIAGNOSIS — E78.2 MIXED HYPERLIPIDEMIA: ICD-10-CM

## 2020-12-14 DIAGNOSIS — E11.65 TYPE 2 DIABETES MELLITUS WITH HYPERGLYCEMIA, WITHOUT LONG-TERM CURRENT USE OF INSULIN (HCC): Primary | ICD-10-CM

## 2020-12-14 PROCEDURE — 90471 IMMUNIZATION ADMIN: CPT | Performed by: NURSE PRACTITIONER

## 2020-12-14 PROCEDURE — 99214 OFFICE O/P EST MOD 30 MIN: CPT | Performed by: NURSE PRACTITIONER

## 2020-12-14 PROCEDURE — 90686 IIV4 VACC NO PRSV 0.5 ML IM: CPT | Performed by: NURSE PRACTITIONER

## 2020-12-14 NOTE — PROGRESS NOTES
Subjective   Yu Sheffield is a 50 y.o. female.     Chief Complaint   Patient presents with   • Diabetes   • Med Refill      HPI new patient to me.  She is here for type 2 diabetes.  She has not been to see her PCP in a while and need an appointment for A1c in order to get her medications refilled.  Ports she is taking and tolerating her medications as directed.  She does not check her blood sugars often but when she does her fasting blood sugars usually around 1 50-1 60.  She is not sure why her diabetes is not better controlled.  She does admit she loves carbohydrates.  She does not eat many sweets.    She has her diabetic eye exam in January.    She has been out of her of a separate for about the last 3 months.  She reports that it is for elevated triglycerides.  She was taking and tolerating it well.    Is taking vesicare for urinary urgency and feels like it works well.  She would like a refill.    Social History     Tobacco Use   • Smoking status: Current Every Day Smoker     Packs/day: 1.00     Start date: 5/1/1986   • Smokeless tobacco: Never Used   Substance Use Topics   • Alcohol use: No   • Drug use: No       The following portions of the patient's history were reviewed and updated as appropriate: allergies, current medications, past family history, past medical history, past social history, past surgical history and problem list.    Review of Systems   Constitutional: Negative for activity change, appetite change, diaphoresis, fatigue and fever.   HENT: Negative for rhinorrhea, sore throat and trouble swallowing.    Respiratory: Negative for cough and shortness of breath.    Cardiovascular: Negative for chest pain, palpitations and leg swelling.   Gastrointestinal: Negative for abdominal pain, blood in stool, constipation, diarrhea, nausea and vomiting.   Endocrine: Negative for polydipsia and polyuria.   Genitourinary: Negative for difficulty urinating, dysuria and hematuria.   Musculoskeletal: Negative  "for arthralgias and myalgias.   Skin: Negative for rash and wound.   Neurological: Negative for dizziness, weakness, numbness and headaches.   Psychiatric/Behavioral: Negative for dysphoric mood and sleep disturbance. The patient is not nervous/anxious.        Objective   Blood pressure 142/74, pulse 89, temperature 97.5 °F (36.4 °C), temperature source Temporal, height 177.8 cm (70\"), weight 124 kg (273 lb 14.4 oz), SpO2 98 %, not currently breastfeeding.  Body mass index is 39.3 kg/m².    Physical Exam  Vitals signs and nursing note reviewed.   Constitutional:       General: She is not in acute distress.     Appearance: She is well-developed. She is not ill-appearing or diaphoretic.   HENT:      Head: Normocephalic and atraumatic.   Eyes:      General:         Right eye: No discharge.         Left eye: No discharge.      Conjunctiva/sclera: Conjunctivae normal.   Cardiovascular:      Rate and Rhythm: Normal rate and regular rhythm.   Pulmonary:      Effort: Pulmonary effort is normal.      Breath sounds: Normal breath sounds.   Abdominal:      General: Bowel sounds are normal.      Palpations: Abdomen is soft.      Tenderness: There is no abdominal tenderness.   Musculoskeletal:         General: No deformity.      Comments: Gait smooth and steady   Skin:     General: Skin is warm and dry.   Neurological:      General: No focal deficit present.      Mental Status: She is alert and oriented to person, place, and time.   Psychiatric:         Mood and Affect: Mood normal.         Behavior: Behavior normal.         Assessment   Problem List Items Addressed This Visit        Cardiovascular and Mediastinum    Hyperlipidemia    Relevant Orders    Lipid Panel With LDL/HDL Ratio    Essential hypertension    Relevant Orders    Comprehensive metabolic panel    CBC and Differential    Microalbumin / Creatinine Urine Ratio - Urine, Clean Catch       Endocrine    Type 2 diabetes mellitus with hyperglycemia, without long-term " current use of insulin (CMS/[facility])    Relevant Orders    Comprehensive metabolic panel    Hemoglobin A1c    Microalbumin / Creatinine Urine Ratio - Urine, Clean Catch      Other Visit Diagnoses     Need for immunization against influenza    -  Primary    Relevant Orders    Fluarix/Fluzone/Afluria Quad>6 Months           Procedures           Impression and Plan: Type 2 diabetes: We will check her A1c today.  She has not had an A1c in about a year.  Her last A1c actually was just above goal of 7.0.  It does look like it had gone up since her previous A1c.  She is taking a lot of medication to control her diabetes and she seems to be compliant.  We discussed carbohydrate management in particular today.  Rather than checking a fasting blood sugar which is not that helpful for her I will have her check a 2-hour postprandial glucose.  She should check after her largest meal which is dinner.  The goal is 160 or less.  This will help her know if she is eating more carbohydrates than her body is able to process.  Then recommend starting to gradually lower her carbohydrates.  When she has good control of dinner she can work on her next meal.    We will check lipids today.  She may need to be back on her base epic.    Her Vesicare seems to be working well for her urinary urgency.     We discussed importance of every 3-month A1c's unless she has good control and they are stable.    We will give her her flu vaccine today.      Health Maintenance Due   Topic Date Due   • Hepatitis B (1 of 3 - Risk 3-dose series) 08/11/1988   • TDAP/TD VACCINES (1 - Tdap) 08/11/1988   • HEPATITIS C SCREENING  02/08/2016   • PAP SMEAR  02/08/2016   • MAMMOGRAM  11/11/2016   • ZOSTER VACCINE (1 of 2) 08/11/2019   • URINE MICROALBUMIN  05/24/2020   • ANNUAL PHYSICAL  05/25/2020   • INFLUENZA VACCINE  08/01/2020   • HEMOGLOBIN A1C  09/06/2020              EMR Dragon/Transcription disclaimer:   Much of this encounter note is an electronic  transcription/translation of spoken language to printed text. The electronic translation of spoken language may permit erroneous, or at times, nonsensical words or phrases to be inadvertently transcribed; Although I have reviewed the note for such errors, some may still exist.

## 2020-12-14 NOTE — PATIENT INSTRUCTIONS
"Check blood sugar two hours after largest meal with goal of 160 or less.  If high, try to reduce amount of carbohydrates until at goal.       Carbohydrate Counting for Diabetes Mellitus, Adult    Carbohydrate counting is a method of keeping track of how many carbohydrates you eat. Eating carbohydrates naturally increases the amount of sugar (glucose) in the blood. Counting how many carbohydrates you eat helps keep your blood glucose within normal limits, which helps you manage your diabetes (diabetes mellitus).  It is important to know how many carbohydrates you can safely have in each meal. This is different for every person. A diet and nutrition specialist (registered dietitian) can help you make a meal plan and calculate how many carbohydrates you should have at each meal and snack.  Carbohydrates are found in the following foods:  · Grains, such as breads and cereals.  · Dried beans and soy products.  · Starchy vegetables, such as potatoes, peas, and corn.  · Fruit and fruit juices.  · Milk and yogurt.  · Sweets and snack foods, such as cake, cookies, candy, chips, and soft drinks.  How do I count carbohydrates?  There are two ways to count carbohydrates in food. You can use either of the methods or a combination of both.  Reading \"Nutrition Facts\" on packaged food  The \"Nutrition Facts\" list is included on the labels of almost all packaged foods and beverages in the [place] It includes:  · The serving size.  · Information about nutrients in each serving, including the grams (g) of carbohydrate per serving.  To use the “Nutrition Facts\":  · Decide how many servings you will have.  · Multiply the number of servings by the number of carbohydrates per serving.  · The resulting number is the total amount of carbohydrates that you will be having.  Learning standard serving sizes of other foods  When you eat carbohydrate foods that are not packaged or do not include \"Nutrition Facts\" on the label, you need to measure the " servings in order to count the amount of carbohydrates:  · Measure the foods that you will eat with a food scale or measuring cup, if needed.  · Decide how many standard-size servings you will eat.  · Multiply the number of servings by 15. Most carbohydrate-rich foods have about 15 g of carbohydrates per serving.  ? For example, if you eat 8 oz (170 g) of strawberries, you will have eaten 2 servings and 30 g of carbohydrates (2 servings x 15 g = 30 g).  · For foods that have more than one food mixed, such as soups and casseroles, you must count the carbohydrates in each food that is included.  The following list contains standard serving sizes of common carbohydrate-rich foods. Each of these servings has about 15 g of carbohydrates:  · ½ hamburger bun or ½ English muffin.  · ½ oz (15 mL) syrup.  · ½ oz (14 g) jelly.  · 1 slice of bread.  · 1 six-inch tortilla.  · 3 oz (85 g) cooked rice or pasta.  · 4 oz (113 g) cooked dried beans.  · 4 oz (113 g) starchy vegetable, such as peas, corn, or potatoes.  · 4 oz (113 g) hot cereal.  · 4 oz (113 g) mashed potatoes or ¼ of a large baked potato.  · 4 oz (113 g) canned or frozen fruit.  · 4 oz (120 mL) fruit juice.  · 4-6 crackers.  · 6 chicken nuggets.  · 6 oz (170 g) unsweetened dry cereal.  · 6 oz (170 g) plain fat-free yogurt or yogurt sweetened with artificial sweeteners.  · 8 oz (240 mL) milk.  · 8 oz (170 g) fresh fruit or one small piece of fruit.  · 24 oz (680 g) popped popcorn.  Example of carbohydrate counting  Sample meal  · 3 oz (85 g) chicken breast.  · 6 oz (170 g) brown rice.  · 4 oz (113 g) corn.  · 8 oz (240 mL) milk.  · 8 oz (170 g) strawberries with sugar-free whipped topping.  Carbohydrate calculation  1. Identify the foods that contain carbohydrates:  ? Rice.  ? Corn.  ? Milk.  ? Strawberries.  2. Calculate how many servings you have of each food:  ? 2 servings rice.  ? 1 serving corn.  ? 1 serving milk.  ? 1 serving strawberries.  3. Multiply each  number of servings by 15 g:  ? 2 servings rice x 15 g = 30 g.  ? 1 serving corn x 15 g = 15 g.  ? 1 serving milk x 15 g = 15 g.  ? 1 serving strawberries x 15 g = 15 g.  4. Add together all of the amounts to find the total grams of carbohydrates eaten:  ? 30 g + 15 g + 15 g + 15 g = 75 g of carbohydrates total.  Summary  · Carbohydrate counting is a method of keeping track of how many carbohydrates you eat.  · Eating carbohydrates naturally increases the amount of sugar (glucose) in the blood.  · Counting how many carbohydrates you eat helps keep your blood glucose within normal limits, which helps you manage your diabetes.  · A diet and nutrition specialist (registered dietitian) can help you make a meal plan and calculate how many carbohydrates you should have at each meal and snack.  This information is not intended to replace advice given to you by your health care provider. Make sure you discuss any questions you have with your health care provider.  Document Revised: 07/12/2018 Document Reviewed: 05/31/2017  Elsevier Patient Education © 2020 [employer]

## 2020-12-15 LAB
ALBUMIN SERPL-MCNC: 4.6 G/DL (ref 3.8–4.9)
ALBUMIN/CREAT UR: 21 MG/G CREAT (ref 0–29)
ALBUMIN/GLOB SERPL: 1.6 {RATIO} (ref 1.2–2.2)
ALP SERPL-CCNC: 58 IU/L (ref 39–117)
ALT SERPL-CCNC: 20 IU/L (ref 0–32)
AST SERPL-CCNC: 11 IU/L (ref 0–40)
BASOPHILS # BLD AUTO: 0.1 X10E3/UL (ref 0–0.2)
BASOPHILS NFR BLD AUTO: 1 %
BILIRUB SERPL-MCNC: 0.3 MG/DL (ref 0–1.2)
BUN SERPL-MCNC: 20 MG/DL (ref 6–24)
BUN/CREAT SERPL: 30 (ref 9–23)
CALCIUM SERPL-MCNC: 9.8 MG/DL (ref 8.7–10.2)
CHLORIDE SERPL-SCNC: 103 MMOL/L (ref 96–106)
CHOLEST SERPL-MCNC: 199 MG/DL (ref 100–199)
CO2 SERPL-SCNC: 20 MMOL/L (ref 20–29)
CREAT SERPL-MCNC: 0.67 MG/DL (ref 0.57–1)
CREAT UR-MCNC: 15.7 MG/DL
EOSINOPHIL # BLD AUTO: 0.6 X10E3/UL (ref 0–0.4)
EOSINOPHIL NFR BLD AUTO: 6 %
ERYTHROCYTE [DISTWIDTH] IN BLOOD BY AUTOMATED COUNT: 14 % (ref 11.7–15.4)
GLOBULIN SER CALC-MCNC: 2.9 G/DL (ref 1.5–4.5)
GLUCOSE SERPL-MCNC: 122 MG/DL (ref 65–99)
HBA1C MFR BLD: 6.8 % (ref 4.8–5.6)
HCT VFR BLD AUTO: 42.4 % (ref 34–46.6)
HDLC SERPL-MCNC: 39 MG/DL
HGB BLD-MCNC: 14.9 G/DL (ref 11.1–15.9)
IMM GRANULOCYTES # BLD AUTO: 0 X10E3/UL (ref 0–0.1)
IMM GRANULOCYTES NFR BLD AUTO: 0 %
LDLC SERPL CALC-MCNC: 112 MG/DL (ref 0–99)
LDLC/HDLC SERPL: 2.9 RATIO (ref 0–3.2)
LYMPHOCYTES # BLD AUTO: 2.9 X10E3/UL (ref 0.7–3.1)
LYMPHOCYTES NFR BLD AUTO: 28 %
MCH RBC QN AUTO: 35.1 PG (ref 26.6–33)
MCHC RBC AUTO-ENTMCNC: 35.1 G/DL (ref 31.5–35.7)
MCV RBC AUTO: 100 FL (ref 79–97)
MICROALBUMIN UR-MCNC: 3.3 UG/ML
MONOCYTES # BLD AUTO: 0.7 X10E3/UL (ref 0.1–0.9)
MONOCYTES NFR BLD AUTO: 6 %
NEUTROPHILS # BLD AUTO: 6 X10E3/UL (ref 1.4–7)
NEUTROPHILS NFR BLD AUTO: 59 %
PLATELET # BLD AUTO: 260 X10E3/UL (ref 150–450)
POTASSIUM SERPL-SCNC: 4.9 MMOL/L (ref 3.5–5.2)
PROT SERPL-MCNC: 7.5 G/DL (ref 6–8.5)
RBC # BLD AUTO: 4.24 X10E6/UL (ref 3.77–5.28)
SODIUM SERPL-SCNC: 139 MMOL/L (ref 134–144)
TRIGL SERPL-MCNC: 278 MG/DL (ref 0–149)
VLDLC SERPL CALC-MCNC: 48 MG/DL (ref 5–40)
WBC # BLD AUTO: 10.2 X10E3/UL (ref 3.4–10.8)

## 2020-12-16 DIAGNOSIS — E11.65 TYPE 2 DIABETES MELLITUS WITH HYPERGLYCEMIA, WITHOUT LONG-TERM CURRENT USE OF INSULIN (HCC): ICD-10-CM

## 2020-12-16 RX ORDER — LISINOPRIL 5 MG/1
5 TABLET ORAL DAILY
Qty: 90 TABLET | Refills: 0 | Status: SHIPPED | OUTPATIENT
Start: 2020-12-16 | End: 2021-03-16 | Stop reason: SDUPTHER

## 2020-12-16 RX ORDER — LIRAGLUTIDE 6 MG/ML
INJECTION SUBCUTANEOUS
Qty: 18 ML | Refills: 3 | Status: SHIPPED | OUTPATIENT
Start: 2020-12-16 | End: 2021-09-21

## 2020-12-16 RX ORDER — DAPAGLIFLOZIN 10 MG/1
1 TABLET, FILM COATED ORAL DAILY
Qty: 30 TABLET | Refills: 2 | Status: SHIPPED | OUTPATIENT
Start: 2020-12-16 | End: 2021-02-25

## 2020-12-16 RX ORDER — ICOSAPENT ETHYL 1000 MG/1
2 CAPSULE ORAL 2 TIMES DAILY WITH MEALS
Qty: 180 CAPSULE | Refills: 0 | Status: SHIPPED | OUTPATIENT
Start: 2020-12-16 | End: 2021-01-25 | Stop reason: SDUPTHER

## 2020-12-16 RX ORDER — SOLIFENACIN SUCCINATE 10 MG/1
10 TABLET, FILM COATED ORAL DAILY
Qty: 90 TABLET | Refills: 0 | Status: SHIPPED | OUTPATIENT
Start: 2020-12-16 | End: 2021-02-25

## 2020-12-17 DIAGNOSIS — Z79.4 CONTROLLED TYPE 2 DIABETES MELLITUS WITH INSULIN THERAPY (HCC): ICD-10-CM

## 2020-12-17 DIAGNOSIS — E11.9 CONTROLLED TYPE 2 DIABETES MELLITUS WITH INSULIN THERAPY (HCC): ICD-10-CM

## 2020-12-17 RX ORDER — PRAVASTATIN SODIUM 40 MG
40 TABLET ORAL DAILY
Qty: 90 TABLET | Refills: 1 | Status: SHIPPED | OUTPATIENT
Start: 2020-12-17 | End: 2021-05-10

## 2020-12-17 RX ORDER — PEN NEEDLE, DIABETIC 29 G X1/2"
NEEDLE, DISPOSABLE MISCELLANEOUS
Qty: 100 EACH | Refills: 3 | Status: SHIPPED | OUTPATIENT
Start: 2020-12-17 | End: 2021-06-22

## 2021-01-26 RX ORDER — ICOSAPENT ETHYL 1000 MG/1
2 CAPSULE ORAL 2 TIMES DAILY WITH MEALS
Qty: 360 CAPSULE | Refills: 0 | Status: SHIPPED | OUTPATIENT
Start: 2021-01-26 | End: 2021-03-16 | Stop reason: SDUPTHER

## 2021-02-25 DIAGNOSIS — E11.65 TYPE 2 DIABETES MELLITUS WITH HYPERGLYCEMIA, WITHOUT LONG-TERM CURRENT USE OF INSULIN (HCC): ICD-10-CM

## 2021-02-25 RX ORDER — DAPAGLIFLOZIN 10 MG/1
TABLET, FILM COATED ORAL
Qty: 30 TABLET | Refills: 2 | Status: SHIPPED | OUTPATIENT
Start: 2021-02-25 | End: 2021-06-01

## 2021-02-25 RX ORDER — SOLIFENACIN SUCCINATE 10 MG/1
TABLET, FILM COATED ORAL
Qty: 90 TABLET | Refills: 0 | Status: SHIPPED | OUTPATIENT
Start: 2021-02-25 | End: 2021-05-26

## 2021-03-16 ENCOUNTER — OFFICE VISIT (OUTPATIENT)
Dept: FAMILY MEDICINE CLINIC | Facility: CLINIC | Age: 51
End: 2021-03-16

## 2021-03-16 VITALS
HEIGHT: 70 IN | HEART RATE: 98 BPM | OXYGEN SATURATION: 96 % | TEMPERATURE: 96.6 F | BODY MASS INDEX: 39.44 KG/M2 | RESPIRATION RATE: 17 BRPM | DIASTOLIC BLOOD PRESSURE: 64 MMHG | SYSTOLIC BLOOD PRESSURE: 120 MMHG | WEIGHT: 275.5 LBS

## 2021-03-16 DIAGNOSIS — I10 ESSENTIAL HYPERTENSION: ICD-10-CM

## 2021-03-16 DIAGNOSIS — E78.2 MIXED HYPERLIPIDEMIA: ICD-10-CM

## 2021-03-16 DIAGNOSIS — Z00.00 ANNUAL PHYSICAL EXAM: ICD-10-CM

## 2021-03-16 DIAGNOSIS — E11.65 TYPE 2 DIABETES MELLITUS WITH HYPERGLYCEMIA, WITHOUT LONG-TERM CURRENT USE OF INSULIN (HCC): Primary | ICD-10-CM

## 2021-03-16 DIAGNOSIS — Z11.59 ENCOUNTER FOR HEPATITIS C SCREENING TEST FOR LOW RISK PATIENT: ICD-10-CM

## 2021-03-16 PROCEDURE — 99396 PREV VISIT EST AGE 40-64: CPT | Performed by: FAMILY MEDICINE

## 2021-03-16 RX ORDER — ICOSAPENT ETHYL 1000 MG/1
2 CAPSULE ORAL 2 TIMES DAILY WITH MEALS
Qty: 360 CAPSULE | Refills: 0 | Status: SHIPPED | OUTPATIENT
Start: 2021-03-16 | End: 2021-06-24

## 2021-03-16 RX ORDER — LISINOPRIL 5 MG/1
5 TABLET ORAL DAILY
Qty: 90 TABLET | Refills: 1 | Status: SHIPPED | OUTPATIENT
Start: 2021-03-16 | End: 2021-09-16 | Stop reason: SDUPTHER

## 2021-03-16 NOTE — PROGRESS NOTES
"Chief Complaint  Diabetes (3 MFU ) and Med Management (DM foot screen )    Subjective          Yu Sheffield presents to [facility]  History of Present Illness  Annual exam  Diet and exercise  She has been fairly inactive working from home.  Because she is working from home and has easy access to be able to smoke she thinks she is smoking more.  She smoking a pack a day.  Last time she tried to quit was about 15 years ago and she is not interested in quitting right now.  Her diet has been better because she is preparing meals at home from food she gets from the store.  She has not eaten out frequently at all this past year related to close lesions.  She has yet to get a mammogram since the [facility] mobile mammogram unit has been not going around.  She had a last Pap smear in 5/2019 and this was negative.  She has not had a pelvic exam since.  She is not having any symptoms pelvic or vaginal.  She has had colon cancer screening with Cologuard in May 2019 that was negative.    DM  Says worse than normal, more like 170 but she is eating more potatoes. She is usually 130-140.  She did go for eye exam   Doing more meals from home   She said about one year ago just working from home.  She takes Victoza, Farxiga, Metformin.  She is tolerating these medications.  She is also on ACE inhibitor and a statin.  Foot exam due.  She is having return of eczema symptoms on her left foot like she does every time this year as the weather starts get warmer and on her bilateral palms and small patches.    HTN  Well controlled.  She is on an ACE inhibitor.    Objective   Vital Signs:   /64 (BP Location: Right arm, Patient Position: Sitting, Cuff Size: Adult)   Pulse 98   Temp 96.6 °F (35.9 °C) (Infrared)   Resp 17   Ht 177.8 cm (70\")   Wt 125 kg (275 lb 8 oz)   SpO2 96%   BMI 39.53 kg/m²     Physical Exam  Vitals reviewed.   Constitutional:       General: She is not in " acute distress.  HENT:      Right Ear: Tympanic membrane, ear canal and external ear normal.      Left Ear: Tympanic membrane, ear canal and external ear normal.      Ears:      Comments: She had cerumen impaction on the right but we were able to remove this fairly easily and she tolerated it well.  Once cleared the tympanic membrane and canal appeared normal.  Eyes:      General: No scleral icterus.        Right eye: No discharge.         Left eye: No discharge.      Conjunctiva/sclera: Conjunctivae normal.   Neck:      Thyroid: No thyromegaly.      Vascular: No carotid bruit.   Cardiovascular:      Rate and Rhythm: Normal rate and regular rhythm.      Pulses:           Dorsalis pedis pulses are 2+ on the right side and 2+ on the left side.        Posterior tibial pulses are 2+ on the right side and 2+ on the left side.      Heart sounds: Normal heart sounds. No murmur. No friction rub. No gallop.    Pulmonary:      Effort: Pulmonary effort is normal. No respiratory distress.      Breath sounds: Normal breath sounds. No wheezing or rales.   Chest:      Chest wall: No tenderness.   Musculoskeletal:         General: No deformity.      Cervical back: Neck supple.      Right lower leg: No edema.      Left lower leg: No edema.      Right foot: Normal range of motion. No deformity or bunion.      Left foot: Normal range of motion. No deformity or bunion.        Feet:    Feet:      Right foot:      Skin integrity: Dry skin present. No ulcer, blister, skin breakdown, erythema or warmth.      Toenail Condition: Right toenails are abnormally thick. Fungal disease present.     Left foot:      Skin integrity: Skin breakdown, erythema and dry skin present. No ulcer, blister or warmth.      Toenail Condition: Left toenails are abnormally thick.   Lymphadenopathy:      Cervical: No cervical adenopathy.   Skin:     General: Skin is warm and dry.   Neurological:      Mental Status: She is alert and oriented to person, place, and  time.      Motor: No abnormal muscle tone.      Coordination: Coordination normal.   Psychiatric:         Mood and Affect: Mood normal.         Behavior: Behavior normal.        Result Review :                 Assessment and Plan    Diagnoses and all orders for this visit:    1. Type 2 diabetes mellitus with hyperglycemia, without long-term current use of insulin (CMS/[facility]) (Primary)  -     Hemoglobin A1c  -     CBC & Differential  -     Comprehensive Metabolic Panel    2. Essential hypertension  -     CBC & Differential  -     Comprehensive Metabolic Panel    3. Mixed hyperlipidemia  -     Lipid Panel    4. Encounter for hepatitis C screening test for low risk patient  -     Hepatitis C Antibody    5. Annual physical exam    Other orders  -     icosapent ethyl (VASCEPA) 1 g capsule capsule; Take 2 g by mouth 2 (Two) Times a Day With Meals.  Dispense: 360 capsule; Refill: 0  -     lisinopril (PRINIVIL,ZESTRIL) 5 MG tablet; Take 1 tablet by mouth Daily.  Dispense: 90 tablet; Refill: 1        Follow Up   No follow-ups on file.  Patient was given instructions and counseling regarding her condition or for health maintenance advice. Please see specific information pulled into the AVS if appropriate.     Preventive counseling  We did discuss smoking and she is not ready to quit so we did not go through any plans for smoking cessation today.  We discussed the importance of regular physical activity.  Cardiovascular activity for the equivalent of 30 minutes 5 days per week.  We also discussed the importance of healthy diet to avoid weight gain and sugar intolerance.  I recommend predominantly filling the diet with vegetables and lean meats followed by fruits and whole grains.  I also recommend overall avoiding processed foods.  She says she is going to call for mammogram now that the mobile unit is operating again.   Recommended pelvic exam.  She is not very anxious to do this.  We will do a pelvic exam in 1 year as that is  when her Pap smear will be due again.  She is willing to do things this way.    We will send her the schedule for the breast cancer screening mobile unit.  They are available Monday, March 15 at the [facility] Wednesday, March 24 at [facility] Tuesday, March 30 [facility] Saturday, April 10 at [facility] Tuesday, April 20 [facility] Wednesday, April 21 [facility].  All times availability are 8:30 AM to 3 PM except for the April 21 date is 9 AM to 3:30 PM.

## 2021-03-17 LAB
ALBUMIN SERPL-MCNC: 4.2 G/DL (ref 3.5–5.2)
ALBUMIN/GLOB SERPL: 1.6 G/DL
ALP SERPL-CCNC: 58 U/L (ref 39–117)
ALT SERPL-CCNC: 24 U/L (ref 1–33)
AST SERPL-CCNC: 15 U/L (ref 1–32)
BASOPHILS # BLD MANUAL: 0.1 10*3/MM3 (ref 0–0.2)
BASOPHILS NFR BLD MANUAL: 1 % (ref 0–1.5)
BILIRUB SERPL-MCNC: 0.3 MG/DL (ref 0–1.2)
BUN SERPL-MCNC: 21 MG/DL (ref 6–20)
BUN/CREAT SERPL: 30.4 (ref 7–25)
CALCIUM SERPL-MCNC: 9.5 MG/DL (ref 8.6–10.5)
CHLORIDE SERPL-SCNC: 103 MMOL/L (ref 98–107)
CHOLEST SERPL-MCNC: 196 MG/DL (ref 0–200)
CO2 SERPL-SCNC: 23.1 MMOL/L (ref 22–29)
CREAT SERPL-MCNC: 0.69 MG/DL (ref 0.57–1)
DIFFERENTIAL COMMENT: ABNORMAL
EOSINOPHIL # BLD MANUAL: 0.75 10*3/MM3 (ref 0–0.4)
EOSINOPHIL NFR BLD MANUAL: 7.3 % (ref 0.3–6.2)
ERYTHROCYTE [DISTWIDTH] IN BLOOD BY AUTOMATED COUNT: 14 % (ref 12.3–15.4)
GLOBULIN SER CALC-MCNC: 2.7 GM/DL
GLUCOSE SERPL-MCNC: 148 MG/DL (ref 65–99)
HBA1C MFR BLD: 7.4 % (ref 4.8–5.6)
HCT VFR BLD AUTO: 42.9 % (ref 34–46.6)
HCV AB S/CO SERPL IA: <0.1 S/CO RATIO (ref 0–0.9)
HDLC SERPL-MCNC: 33 MG/DL (ref 40–60)
HGB BLD-MCNC: 14.5 G/DL (ref 12–15.9)
LDLC SERPL CALC-MCNC: 96 MG/DL (ref 0–100)
LYMPHOCYTES # BLD MANUAL: 3.3 10*3/MM3 (ref 0.7–3.1)
LYMPHOCYTES NFR BLD MANUAL: 32.3 % (ref 19.6–45.3)
MCH RBC QN AUTO: 33.6 PG (ref 26.6–33)
MCHC RBC AUTO-ENTMCNC: 33.8 G/DL (ref 31.5–35.7)
MCV RBC AUTO: 99.3 FL (ref 79–97)
MONOCYTES # BLD MANUAL: 0.21 10*3/MM3 (ref 0.1–0.9)
MONOCYTES NFR BLD MANUAL: 2.1 % (ref 5–12)
NEUTROPHILS # BLD MANUAL: 5.86 10*3/MM3 (ref 1.7–7)
NEUTROPHILS NFR BLD MANUAL: 57.3 % (ref 42.7–76)
NRBC BLD AUTO-RTO: 0 /100 WBC (ref 0–0.2)
PLATELET # BLD AUTO: 262 10*3/MM3 (ref 140–450)
PLATELET BLD QL SMEAR: ABNORMAL
POTASSIUM SERPL-SCNC: 5.1 MMOL/L (ref 3.5–5.2)
PROT SERPL-MCNC: 6.9 G/DL (ref 6–8.5)
RBC # BLD AUTO: 4.32 10*6/MM3 (ref 3.77–5.28)
RBC MORPH BLD: ABNORMAL
SODIUM SERPL-SCNC: 138 MMOL/L (ref 136–145)
TRIGL SERPL-MCNC: 397 MG/DL (ref 0–150)
VLDLC SERPL CALC-MCNC: 67 MG/DL (ref 5–40)
WBC # BLD AUTO: 10.23 10*3/MM3 (ref 3.4–10.8)

## 2021-05-10 RX ORDER — PRAVASTATIN SODIUM 40 MG
TABLET ORAL
Qty: 90 TABLET | Refills: 1 | Status: SHIPPED | OUTPATIENT
Start: 2021-05-10 | End: 2021-11-30

## 2021-05-26 RX ORDER — SOLIFENACIN SUCCINATE 10 MG/1
TABLET, FILM COATED ORAL
Qty: 90 TABLET | Refills: 0 | Status: SHIPPED | OUTPATIENT
Start: 2021-05-26 | End: 2021-08-26

## 2021-05-29 DIAGNOSIS — E11.65 TYPE 2 DIABETES MELLITUS WITH HYPERGLYCEMIA, WITHOUT LONG-TERM CURRENT USE OF INSULIN (HCC): ICD-10-CM

## 2021-06-01 RX ORDER — DAPAGLIFLOZIN 10 MG/1
TABLET, FILM COATED ORAL
Qty: 30 TABLET | Refills: 2 | Status: SHIPPED | OUTPATIENT
Start: 2021-06-01 | End: 2021-07-20 | Stop reason: SDUPTHER

## 2021-06-22 DIAGNOSIS — Z79.4 CONTROLLED TYPE 2 DIABETES MELLITUS WITH INSULIN THERAPY (HCC): ICD-10-CM

## 2021-06-22 DIAGNOSIS — E11.9 CONTROLLED TYPE 2 DIABETES MELLITUS WITH INSULIN THERAPY (HCC): ICD-10-CM

## 2021-06-22 RX ORDER — PEN NEEDLE, DIABETIC 29 G X1/2"
NEEDLE, DISPOSABLE MISCELLANEOUS
Qty: 90 EACH | Refills: 3 | Status: SHIPPED | OUTPATIENT
Start: 2021-06-22 | End: 2022-06-11

## 2021-06-24 RX ORDER — ICOSAPENT ETHYL 1000 MG/1
CAPSULE ORAL
Qty: 360 CAPSULE | Refills: 0 | Status: SHIPPED | OUTPATIENT
Start: 2021-06-24 | End: 2021-09-23

## 2021-07-20 DIAGNOSIS — E11.65 TYPE 2 DIABETES MELLITUS WITH HYPERGLYCEMIA, WITHOUT LONG-TERM CURRENT USE OF INSULIN (HCC): ICD-10-CM

## 2021-07-21 RX ORDER — DAPAGLIFLOZIN 10 MG/1
1 TABLET, FILM COATED ORAL DAILY
Qty: 90 TABLET | Refills: 1 | Status: SHIPPED | OUTPATIENT
Start: 2021-07-21 | End: 2021-12-09

## 2021-08-26 RX ORDER — SOLIFENACIN SUCCINATE 10 MG/1
TABLET, FILM COATED ORAL
Qty: 90 TABLET | Refills: 0 | Status: SHIPPED | OUTPATIENT
Start: 2021-08-26 | End: 2021-11-30

## 2021-08-26 NOTE — TELEPHONE ENCOUNTER
Rx Refill Note  Requested Prescriptions     Pending Prescriptions Disp Refills   • solifenacin (VESICARE) 10 MG tablet [Pharmacy Med Name: SOLIFENACIN  TAB 10MG] 90 tablet 0     Sig: TAKE 1 TABLET DAILY      Last office visit with prescribing clinician: 3/16/2021      Next office visit with prescribing clinician: 9/16/2021            Cristin Plummer LPN  08/26/21, 16:25 EDT

## 2021-09-16 ENCOUNTER — OFFICE VISIT (OUTPATIENT)
Dept: FAMILY MEDICINE CLINIC | Facility: CLINIC | Age: 51
End: 2021-09-16

## 2021-09-16 VITALS
BODY MASS INDEX: 37.21 KG/M2 | HEIGHT: 70 IN | HEART RATE: 88 BPM | DIASTOLIC BLOOD PRESSURE: 64 MMHG | WEIGHT: 259.9 LBS | OXYGEN SATURATION: 96 % | SYSTOLIC BLOOD PRESSURE: 116 MMHG | TEMPERATURE: 96.9 F | RESPIRATION RATE: 17 BRPM

## 2021-09-16 DIAGNOSIS — E11.65 TYPE 2 DIABETES MELLITUS WITH HYPERGLYCEMIA, WITHOUT LONG-TERM CURRENT USE OF INSULIN (HCC): Primary | ICD-10-CM

## 2021-09-16 DIAGNOSIS — I10 ESSENTIAL HYPERTENSION: ICD-10-CM

## 2021-09-16 DIAGNOSIS — Z12.31 ENCOUNTER FOR SCREENING MAMMOGRAM FOR MALIGNANT NEOPLASM OF BREAST: ICD-10-CM

## 2021-09-16 DIAGNOSIS — E78.2 MIXED HYPERLIPIDEMIA: ICD-10-CM

## 2021-09-16 PROCEDURE — 99214 OFFICE O/P EST MOD 30 MIN: CPT | Performed by: FAMILY MEDICINE

## 2021-09-16 RX ORDER — LISINOPRIL 5 MG/1
5 TABLET ORAL DAILY
Qty: 90 TABLET | Refills: 1 | Status: SHIPPED | OUTPATIENT
Start: 2021-09-16 | End: 2022-03-02

## 2021-09-16 NOTE — PROGRESS NOTES
"Chief Complaint  Diabetes (6 MFU )    Subjective          [facility] presents to [facility]  History of Present Illness  Diabetes  Checks her sugar every morning. This morning she had sugar of 161. This is pretty typical 150-160.   Feet good except the left foot with the psoriasis. Was getting better but now worse than it was. She is seeing derm, Dr. Loyd Nazario.     Tic bite and she was treated at . Was on the back of her right leg. It swelled and she received keflex. Thought was infected and developed cellulitis.  No rash. No fever,     She went to derm and she has psoriasis. Given some creams. Was helping and now it is not.     [facility] [address] exist she could get her mammogra. Usually did the mobile unit and has not gone.     Objective   Vital Signs:   /64 (BP Location: Right arm, Patient Position: Sitting, Cuff Size: Adult)   Pulse 88   Temp 96.9 °F (36.1 °C) (Infrared)   Resp 17   Ht 177.8 cm (70\")   Wt 118 kg (259 lb 14.4 oz)   SpO2 96%   BMI 37.29 kg/m²     Physical Exam  Vitals reviewed.   Constitutional:       General: She is not in acute distress.  Eyes:      General: No scleral icterus.        Right eye: No discharge.         Left eye: No discharge.      Conjunctiva/sclera: Conjunctivae normal.   Cardiovascular:      Rate and Rhythm: Normal rate and regular rhythm.      Heart sounds: Normal heart sounds. No murmur heard.     Pulmonary:      Effort: Pulmonary effort is normal. No respiratory distress.      Breath sounds: Normal breath sounds. No wheezing.   Musculoskeletal:      Cervical back: Neck supple. No muscular tenderness.      Right lower leg: No edema.      Left lower leg: No edema.   Lymphadenopathy:      Cervical: No cervical adenopathy.   Neurological:      Mental Status: She is oriented to person, place, and time.      Motor: No abnormal muscle tone.   Psychiatric:         Behavior: Behavior normal.        Result Review :               "   Assessment and Plan    Diagnoses and all orders for this visit:    1. Type 2 diabetes mellitus with hyperglycemia, without long-term current use of insulin (CMS/[facility]) (Primary)  -     Hemoglobin A1c  -     CBC & Differential  -     Comprehensive Metabolic Panel    2. Mixed hyperlipidemia  -     Lipid Panel    3. Essential hypertension  -     CBC & Differential  -     Comprehensive Metabolic Panel    4. Encounter for screening mammogram for malignant neoplasm of breast  -     Mammo Screening Bilateral With CAD; Future    Other orders  -     lisinopril (PRINIVIL,ZESTRIL) 5 MG tablet; Take 1 tablet by mouth Daily.  Dispense: 90 tablet; Refill: 1        Follow Up   No follow-ups on file.  Patient was given instructions and counseling regarding her condition or for health maintenance advice. Please see specific information pulled into the AVS if appropriate.     Her sugars were running a little bit high. She has been doing really well with A1c of 6.8 but it went up to 7.4. She has continued on the Farxiga, Victoza, metformin, and we will see where she is at today. Sounds like she is running right at the upper limits of recommended sugar level for morning fasting. May need to make adjustments and have her back sooner if her A1c continues to be elevated. She voiced understanding.  Working with dermatology on her skin. She was diagnosed with psoriasis since I last saw her. She was having improvement on the treatment and now is doing worse again. Were going to request the records from Dr. Joseph Nazario the dermatologist and patient has follow-up to check in with him on her worsening symptoms.  The site where her tick bite was has significantly improved. There is just 1 small half a centimeter pink macule with central dry skin. I told her to put some Vaseline on this so she is not inclined to keep picking at the dry skin that she could make trouble with introducing bacteria and having another infection there. We talked about  testing for Lyme today. She is not having any symptoms and I suggest that we hold on the Lyme disease testing because we do not have significant indication and it is very expensive testing. I have documented it though so if she develops any symptoms in the near future we certainly can evaluate appropriately.  She is on Vascepa for her cholesterol and triglycerides. We are going to check in on that as is a new medication for her.  Her blood pressure is very well controlled and we are going to continue the low-dose of lisinopril.    I told her next time she comes back for her physical we will be talking about the mammogram she is about to get done. She wanted to be sent to the [facility] facility in [facility] and so that is what I requested.

## 2021-09-17 LAB
ALBUMIN SERPL-MCNC: 4.7 G/DL (ref 3.5–5.2)
ALBUMIN/GLOB SERPL: 1.9 G/DL
ALP SERPL-CCNC: 62 U/L (ref 39–117)
ALT SERPL-CCNC: 17 U/L (ref 1–33)
AST SERPL-CCNC: 16 U/L (ref 1–32)
BASOPHILS # BLD AUTO: 0.1 10*3/MM3 (ref 0–0.2)
BASOPHILS NFR BLD AUTO: 1 % (ref 0–1.5)
BILIRUB SERPL-MCNC: 0.4 MG/DL (ref 0–1.2)
BUN SERPL-MCNC: 24 MG/DL (ref 6–20)
BUN/CREAT SERPL: 31.2 (ref 7–25)
CALCIUM SERPL-MCNC: 9.4 MG/DL (ref 8.6–10.5)
CHLORIDE SERPL-SCNC: 104 MMOL/L (ref 98–107)
CHOLEST SERPL-MCNC: 195 MG/DL (ref 0–200)
CO2 SERPL-SCNC: 19.4 MMOL/L (ref 22–29)
CREAT SERPL-MCNC: 0.77 MG/DL (ref 0.57–1)
EOSINOPHIL # BLD AUTO: 0.47 10*3/MM3 (ref 0–0.4)
EOSINOPHIL NFR BLD AUTO: 4.9 % (ref 0.3–6.2)
ERYTHROCYTE [DISTWIDTH] IN BLOOD BY AUTOMATED COUNT: 14 % (ref 12.3–15.4)
GLOBULIN SER CALC-MCNC: 2.5 GM/DL
GLUCOSE SERPL-MCNC: 130 MG/DL (ref 65–99)
HBA1C MFR BLD: 7 % (ref 4.8–5.6)
HCT VFR BLD AUTO: 44.9 % (ref 34–46.6)
HDLC SERPL-MCNC: 33 MG/DL (ref 40–60)
HGB BLD-MCNC: 15.4 G/DL (ref 12–15.9)
IMM GRANULOCYTES # BLD AUTO: 0.02 10*3/MM3 (ref 0–0.05)
IMM GRANULOCYTES NFR BLD AUTO: 0.2 % (ref 0–0.5)
LDLC SERPL CALC-MCNC: 104 MG/DL (ref 0–100)
LYMPHOCYTES # BLD AUTO: 2.78 10*3/MM3 (ref 0.7–3.1)
LYMPHOCYTES NFR BLD AUTO: 29 % (ref 19.6–45.3)
MCH RBC QN AUTO: 34.3 PG (ref 26.6–33)
MCHC RBC AUTO-ENTMCNC: 34.3 G/DL (ref 31.5–35.7)
MCV RBC AUTO: 100 FL (ref 79–97)
MONOCYTES # BLD AUTO: 0.64 10*3/MM3 (ref 0.1–0.9)
MONOCYTES NFR BLD AUTO: 6.7 % (ref 5–12)
NEUTROPHILS # BLD AUTO: 5.58 10*3/MM3 (ref 1.7–7)
NEUTROPHILS NFR BLD AUTO: 58.2 % (ref 42.7–76)
NRBC BLD AUTO-RTO: 0 /100 WBC (ref 0–0.2)
PLATELET # BLD AUTO: 274 10*3/MM3 (ref 140–450)
POTASSIUM SERPL-SCNC: 5.3 MMOL/L (ref 3.5–5.2)
PROT SERPL-MCNC: 7.2 G/DL (ref 6–8.5)
RBC # BLD AUTO: 4.49 10*6/MM3 (ref 3.77–5.28)
SODIUM SERPL-SCNC: 138 MMOL/L (ref 136–145)
TRIGL SERPL-MCNC: 340 MG/DL (ref 0–150)
VLDLC SERPL CALC-MCNC: 58 MG/DL (ref 5–40)
WBC # BLD AUTO: 9.59 10*3/MM3 (ref 3.4–10.8)

## 2021-09-20 NOTE — TELEPHONE ENCOUNTER
Rx Refill Note  Requested Prescriptions     Pending Prescriptions Disp Refills    Victoza 18 MG/3ML solution pen-injector injection [Pharmacy Med Name: VICTOZA 3'S  PEN 18MG/3ML] 18 mL 3     Sig: INJECT 1.2MG SUBCUTANEOUSLYDAILY      Last office visit with prescribing clinician: 9/16/2021      Next office visit with prescribing clinician: 4/5/2022            Ana Nielsen  09/20/21, 07:59 EDT

## 2021-09-21 RX ORDER — LIRAGLUTIDE 6 MG/ML
INJECTION SUBCUTANEOUS
Qty: 18 ML | Refills: 3 | Status: SHIPPED | OUTPATIENT
Start: 2021-09-21 | End: 2022-09-07

## 2021-09-23 RX ORDER — ICOSAPENT ETHYL 1000 MG/1
CAPSULE ORAL
Qty: 360 CAPSULE | Refills: 2 | Status: SHIPPED | OUTPATIENT
Start: 2021-09-23 | End: 2022-06-11

## 2021-11-01 NOTE — TELEPHONE ENCOUNTER
Rx Refill Note  Requested Prescriptions     Pending Prescriptions Disp Refills   • metFORMIN (GLUCOPHAGE) 500 MG tablet [Pharmacy Med Name: METFORMIN TAB 500MG] 360 tablet 1     Sig: TAKE 2 TABLETS TWICE A DAY      Last office visit with prescribing clinician: 9/16/2021      Next office visit with prescribing clinician: 4/5/2022            Cristin Plummer LPN  11/01/21, 12:57 EDT

## 2021-11-30 RX ORDER — SOLIFENACIN SUCCINATE 10 MG/1
TABLET, FILM COATED ORAL
Qty: 90 TABLET | Refills: 1 | Status: SHIPPED | OUTPATIENT
Start: 2021-11-30 | End: 2022-05-20

## 2021-11-30 RX ORDER — PRAVASTATIN SODIUM 40 MG
TABLET ORAL
Qty: 90 TABLET | Refills: 3 | Status: SHIPPED | OUTPATIENT
Start: 2021-11-30 | End: 2022-11-17

## 2021-12-06 DIAGNOSIS — E11.65 TYPE 2 DIABETES MELLITUS WITH HYPERGLYCEMIA, WITHOUT LONG-TERM CURRENT USE OF INSULIN (HCC): ICD-10-CM

## 2021-12-08 NOTE — TELEPHONE ENCOUNTER
Rx Refill Note  Requested Prescriptions     Pending Prescriptions Disp Refills   • Farxiga 10 MG tablet [Pharmacy Med Name: FARXIGA TAB 10MG] 90 tablet 1     Sig: TAKE 1 TABLET DAILY      Last office visit with prescribing clinician: 9/16/2021      Next office visit with prescribing clinician: 4/5/2022            Cristin Plummer LPN  12/08/21, 12:22 EST

## 2021-12-09 RX ORDER — DAPAGLIFLOZIN 10 MG/1
TABLET, FILM COATED ORAL
Qty: 90 TABLET | Refills: 1 | Status: SHIPPED | OUTPATIENT
Start: 2021-12-09 | End: 2022-01-30 | Stop reason: SDUPTHER

## 2022-01-30 DIAGNOSIS — E11.65 TYPE 2 DIABETES MELLITUS WITH HYPERGLYCEMIA, WITHOUT LONG-TERM CURRENT USE OF INSULIN: ICD-10-CM

## 2022-01-31 RX ORDER — DAPAGLIFLOZIN 10 MG/1
1 TABLET, FILM COATED ORAL DAILY
Qty: 90 TABLET | Refills: 1 | Status: SHIPPED | OUTPATIENT
Start: 2022-01-31 | End: 2022-07-06

## 2022-01-31 NOTE — TELEPHONE ENCOUNTER
Rx Refill Note  Requested Prescriptions     Pending Prescriptions Disp Refills   • Dapagliflozin Propanediol (Farxiga) 10 MG tablet 90 tablet 1     Sig: Take 10 mg by mouth Daily.      Last office visit with prescribing clinician: 9/16/2021      Next office visit with prescribing clinician: 4/5/2022            Cristin Plummer LPN  01/31/22, 12:36 EST

## 2022-03-01 NOTE — TELEPHONE ENCOUNTER
Rx Refill Note  Requested Prescriptions     Pending Prescriptions Disp Refills   • lisinopril (PRINIVIL,ZESTRIL) 5 MG tablet [Pharmacy Med Name: LISINOPRIL TAB 5MG] 90 tablet 1     Sig: TAKE 1 TABLET DAILY      Last office visit with prescribing clinician: 9/16/2021      Next office visit with prescribing clinician: 7/12/2022            Cristin Plummer LPN  03/01/22, 13:35 EST

## 2022-03-02 RX ORDER — LISINOPRIL 5 MG/1
TABLET ORAL
Qty: 90 TABLET | Refills: 1 | Status: SHIPPED | OUTPATIENT
Start: 2022-03-02 | End: 2022-09-07

## 2022-04-29 NOTE — TELEPHONE ENCOUNTER
Rx Refill Note  Requested Prescriptions     Pending Prescriptions Disp Refills   • metFORMIN (GLUCOPHAGE) 500 MG tablet [Pharmacy Med Name: METFORMIN TAB 500MG] 360 tablet 1     Sig: TAKE 2 TABLETS TWICE A DAY      Last office visit with prescribing clinician: 9/16/2021      Next office visit with prescribing clinician: 7/12/2022            Cristin Plummer LPN  04/29/22, 16:42 EDT

## 2022-05-20 RX ORDER — SOLIFENACIN SUCCINATE 10 MG/1
TABLET, FILM COATED ORAL
Qty: 90 TABLET | Refills: 1 | Status: SHIPPED | OUTPATIENT
Start: 2022-05-20 | End: 2022-11-17

## 2022-05-20 NOTE — TELEPHONE ENCOUNTER
Rx Refill Note  Requested Prescriptions     Pending Prescriptions Disp Refills   • solifenacin (VESICARE) 10 MG tablet [Pharmacy Med Name: SOLIFENACIN  TAB 10MG] 90 tablet 1     Sig: TAKE 1 TABLET DAILY      Last office visit with prescribing clinician: 9/16/2021      Next office visit with prescribing clinician: 7/12/2022            Doc Newman MA  05/20/22, 08:40 EDT

## 2022-06-10 DIAGNOSIS — Z79.4 CONTROLLED TYPE 2 DIABETES MELLITUS WITH INSULIN THERAPY: ICD-10-CM

## 2022-06-10 DIAGNOSIS — E11.9 CONTROLLED TYPE 2 DIABETES MELLITUS WITH INSULIN THERAPY: ICD-10-CM

## 2022-06-10 NOTE — TELEPHONE ENCOUNTER
Rx Refill Note  Requested Prescriptions     Pending Prescriptions Disp Refills   • BD ULTRA-FINE PEN NEEDLES 29G X 12.7MM misc [Pharmacy Med Name: BD ORIG NDL  PEN 29GX12.7] 90 each 3     Sig: USE AS DIRECTED ONCE DAILY   • Vascepa 1 g capsule capsule [Pharmacy Med Name: VASCEPA CAP 1GM] 360 capsule 2     Sig: TAKE 2 CAPSULES TWO TIMES ADAY WITH MEALS.      Last office visit with prescribing clinician: 9/16/2021      Next office visit with prescribing clinician: 7/12/2022            Cristin Plummer LPN  06/10/22, 08:35 EDT

## 2022-06-11 RX ORDER — ICOSAPENT ETHYL 1000 MG/1
CAPSULE ORAL
Qty: 360 CAPSULE | Refills: 2 | Status: SHIPPED | OUTPATIENT
Start: 2022-06-11 | End: 2023-02-14

## 2022-06-11 RX ORDER — PEN NEEDLE, DIABETIC 29 G X1/2"
NEEDLE, DISPOSABLE MISCELLANEOUS
Qty: 90 EACH | Refills: 3 | Status: SHIPPED | OUTPATIENT
Start: 2022-06-11

## 2022-07-03 DIAGNOSIS — E11.65 TYPE 2 DIABETES MELLITUS WITH HYPERGLYCEMIA, WITHOUT LONG-TERM CURRENT USE OF INSULIN: ICD-10-CM

## 2022-07-05 NOTE — TELEPHONE ENCOUNTER
Rx Refill Note  Requested Prescriptions     Pending Prescriptions Disp Refills   • Farxiga 10 MG tablet [Pharmacy Med Name: FARXIGA TAB 10MG] 30 tablet 2     Sig: TAKE 1 TABLET DAILY      Last office visit with prescribing clinician: 9/16/2021      Next office visit with prescribing clinician: 7/12/2022            Bronwyn Farias MA  07/05/22, 09:07 EDT

## 2022-07-06 RX ORDER — DAPAGLIFLOZIN 10 MG/1
TABLET, FILM COATED ORAL
Qty: 30 TABLET | Refills: 2 | Status: SHIPPED | OUTPATIENT
Start: 2022-07-06 | End: 2023-01-27 | Stop reason: SDUPTHER

## 2022-07-06 NOTE — TELEPHONE ENCOUNTER
Pt needs appt. We had to cancel and reschedule and no she has cancelled and does not have follow up. Thanks.

## 2022-09-06 NOTE — TELEPHONE ENCOUNTER
Rx Refill Note  Requested Prescriptions     Pending Prescriptions Disp Refills   • lisinopril (PRINIVIL,ZESTRIL) 5 MG tablet [Pharmacy Med Name: LISINOPRIL TAB 5MG] 90 tablet 1     Sig: TAKE 1 TABLET DAILY   • Victoza 18 MG/3ML solution pen-injector injection [Pharmacy Med Name: VICTOZA 3'S  PEN 18MG/3ML] 18 mL 3     Sig: INJECT 1.2MG SUBCUTANEOUSLYDAILY      Last office visit with prescribing clinician: 9/16/2021      Next office visit with prescribing clinician: Visit date not found            Cristin Plummer LPN  09/06/22, 18:07 EDT

## 2022-09-07 RX ORDER — LIRAGLUTIDE 6 MG/ML
INJECTION SUBCUTANEOUS
Qty: 18 ML | Refills: 0 | Status: SHIPPED | OUTPATIENT
Start: 2022-09-07 | End: 2023-01-27 | Stop reason: SDUPTHER

## 2022-09-07 RX ORDER — LISINOPRIL 5 MG/1
TABLET ORAL
Qty: 90 TABLET | Refills: 0 | Status: SHIPPED | OUTPATIENT
Start: 2022-09-07 | End: 2022-11-17

## 2022-09-20 NOTE — TELEPHONE ENCOUNTER
Called this patient and left detailed VM for her to call this nurse back to get her scheduled. Needs to be done at Office level.       HUB tranfers this patient to office on call back to speak with this nurse.     OFFICE find this nurse on call back. Have had hard time getting in contact with this patient. NEEDS to be scheduled.

## 2022-11-16 NOTE — TELEPHONE ENCOUNTER
Rx Refill Note  Requested Prescriptions     Pending Prescriptions Disp Refills   • lisinopril (PRINIVIL,ZESTRIL) 5 MG tablet [Pharmacy Med Name: LISINOPRIL TAB 5MG] 90 tablet 0     Sig: TAKE 1 TABLET DAILY   • solifenacin (VESICARE) 10 MG tablet [Pharmacy Med Name: SOLIFENACIN  TAB 10MG] 90 tablet 1     Sig: TAKE 1 TABLET DAILY   • pravastatin (PRAVACHOL) 40 MG tablet [Pharmacy Med Name: PRAVASTATIN  TAB 40MG] 90 tablet 3     Sig: TAKE 1 TABLET DAILY      Last office visit with prescribing clinician: 9/16/2021      Next office visit with prescribing clinician: Visit date not found       {TIP  Please add Last Relevant Lab Date if appropriate: 09/16/21    Janine Bhakta MA  11/16/22, 13:43 EST

## 2022-11-17 RX ORDER — PRAVASTATIN SODIUM 40 MG
TABLET ORAL
Qty: 90 TABLET | Refills: 3 | Status: SHIPPED | OUTPATIENT
Start: 2022-11-17

## 2022-11-17 RX ORDER — SOLIFENACIN SUCCINATE 10 MG/1
TABLET, FILM COATED ORAL
Qty: 90 TABLET | Refills: 0 | Status: SHIPPED | OUTPATIENT
Start: 2022-11-17 | End: 2023-02-14

## 2022-11-17 RX ORDER — LISINOPRIL 5 MG/1
TABLET ORAL
Qty: 90 TABLET | Refills: 0 | Status: SHIPPED | OUTPATIENT
Start: 2022-11-17 | End: 2023-02-14

## 2022-11-17 NOTE — TELEPHONE ENCOUNTER
Pt has been contracted on multiple occassions. VM were left and instructed to schedule. Will have  reach out again.

## 2022-12-05 NOTE — TELEPHONE ENCOUNTER
Rx Refill Note  Requested Prescriptions     Pending Prescriptions Disp Refills   • Victoza 18 MG/3ML solution pen-injector injection [Pharmacy Med Name: VICTOZA 3'S  PEN 18MG/3ML] 18 mL 0     Sig: INJECT 1.2MG SUBCUTANEOUSLYDAILY      Last office visit with prescribing clinician: 9/16/2021   Last telemedicine visit with prescribing clinician: Visit date not found   Next office visit with prescribing clinician: Visit date not found                         Would you like a call back once the refill request has been completed: [] Yes [] No    If the office needs to give you a call back, can they leave a voicemail: [] Yes [] No    Cristin Plummer LPN  12/05/22, 08:36 EST

## 2022-12-06 RX ORDER — LIRAGLUTIDE 6 MG/ML
INJECTION SUBCUTANEOUS
Qty: 18 ML | Refills: 0 | OUTPATIENT
Start: 2022-12-06

## 2023-01-27 ENCOUNTER — OFFICE VISIT (OUTPATIENT)
Dept: FAMILY MEDICINE CLINIC | Facility: CLINIC | Age: 53
End: 2023-01-27
Payer: COMMERCIAL

## 2023-01-27 VITALS
WEIGHT: 269.1 LBS | TEMPERATURE: 97.8 F | RESPIRATION RATE: 20 BRPM | BODY MASS INDEX: 38.52 KG/M2 | HEIGHT: 70 IN | SYSTOLIC BLOOD PRESSURE: 134 MMHG | DIASTOLIC BLOOD PRESSURE: 60 MMHG | OXYGEN SATURATION: 95 % | HEART RATE: 101 BPM

## 2023-01-27 DIAGNOSIS — Z12.11 COLON CANCER SCREENING: ICD-10-CM

## 2023-01-27 DIAGNOSIS — E78.2 MIXED HYPERLIPIDEMIA: ICD-10-CM

## 2023-01-27 DIAGNOSIS — I10 ESSENTIAL HYPERTENSION: ICD-10-CM

## 2023-01-27 DIAGNOSIS — Z12.31 ENCOUNTER FOR SCREENING MAMMOGRAM FOR MALIGNANT NEOPLASM OF BREAST: ICD-10-CM

## 2023-01-27 DIAGNOSIS — Z72.0 TOBACCO ABUSE: ICD-10-CM

## 2023-01-27 DIAGNOSIS — Z00.00 ANNUAL PHYSICAL EXAM: Primary | ICD-10-CM

## 2023-01-27 DIAGNOSIS — Z12.4 SCREENING FOR CERVICAL CANCER: ICD-10-CM

## 2023-01-27 DIAGNOSIS — E11.65 TYPE 2 DIABETES MELLITUS WITH HYPERGLYCEMIA, WITHOUT LONG-TERM CURRENT USE OF INSULIN: ICD-10-CM

## 2023-01-27 PROCEDURE — 99396 PREV VISIT EST AGE 40-64: CPT | Performed by: FAMILY MEDICINE

## 2023-01-27 RX ORDER — DAPAGLIFLOZIN 10 MG/1
1 TABLET, FILM COATED ORAL DAILY
Qty: 90 TABLET | Refills: 1 | Status: SHIPPED | OUTPATIENT
Start: 2023-01-27

## 2023-01-27 RX ORDER — LIRAGLUTIDE 6 MG/ML
1.2 INJECTION SUBCUTANEOUS DAILY
Qty: 18 ML | Refills: 1 | Status: SHIPPED | OUTPATIENT
Start: 2023-01-27

## 2023-01-27 NOTE — PROGRESS NOTES
"Chief Complaint  Annual Exam and Gynecologic Exam    Subjective        Yu Sheffield presents to [facility]  History of Present Illness  Annual exam  She is due for cologuard.   She is due for mammogram  [facility]  She is one year past due.   She was sick in October. Said it was not COVID.   Working a hybrid. Not exercising.   Still smoking  She would be interested in screening for lung cancer.    DM  Says her A1c is going to be horrible. She ran out of farxiga.   Was late on appt.   She was out of farxiga for months.   She just ran out of victoza.   Says 180-200s.     Objective   Vital Signs:  /60 (BP Location: Right arm, Patient Position: Sitting, Cuff Size: Adult)   Pulse 101   Temp 97.8 °F (36.6 °C) (Infrared)   Resp 20   Ht 177.8 cm (70\")   Wt 122 kg (269 lb 1.6 oz)   SpO2 95%   BMI 38.61 kg/m²   Estimated body mass index is 38.61 kg/m² as calculated from the following:    Height as of this encounter: 177.8 cm (70\").    Weight as of this encounter: 122 kg (269 lb 1.6 oz).       Class 2 Severe Obesity (BMI >=35 and <=39.9). Obesity-related health conditions include the following: hypertension, diabetes mellitus and dyslipidemias. Obesity is unchanged. BMI is is above average; BMI management plan is completed. We discussed portion control and increasing exercise.      Physical Exam  Vitals reviewed.   Constitutional:       General: She is not in acute distress.     Appearance: Normal appearance. She is obese. She is not ill-appearing or toxic-appearing.   HENT:      Head: Normocephalic and atraumatic.      Right Ear: Tympanic membrane, ear canal and external ear normal. There is no impacted cerumen.      Left Ear: Tympanic membrane, ear canal and external ear normal. There is no impacted cerumen.      Nose: Nose normal.      Mouth/Throat:      Mouth: Mucous membranes are moist.      Pharynx: Oropharynx is clear. No oropharyngeal exudate or posterior " oropharyngeal erythema.   Eyes:      General: No scleral icterus.        Right eye: No discharge.         Left eye: No discharge.      Conjunctiva/sclera: Conjunctivae normal.   Neck:      Thyroid: No thyromegaly.      Vascular: No carotid bruit.   Cardiovascular:      Rate and Rhythm: Normal rate and regular rhythm.      Pulses:           Dorsalis pedis pulses are 1+ on the right side and 1+ on the left side.        Posterior tibial pulses are 1+ on the right side and 1+ on the left side.      Heart sounds: Normal heart sounds. No murmur heard.    No friction rub. No gallop.   Pulmonary:      Effort: Pulmonary effort is normal. No respiratory distress.      Breath sounds: Normal breath sounds. No wheezing or rales.   Chest:      Chest wall: No tenderness.   Abdominal:      General: Bowel sounds are normal. There is no distension.      Palpations: Abdomen is soft. There is no mass.      Tenderness: There is no abdominal tenderness. There is no guarding.   Musculoskeletal:         General: No deformity.      Cervical back: Neck supple.      Right lower leg: No edema.      Left lower leg: No edema.      Right foot: Normal range of motion. No deformity, Charcot foot or foot drop.      Left foot: Normal range of motion. No deformity, Charcot foot or foot drop.        Feet:    Feet:      Right foot:      Skin integrity: Dry skin present. No ulcer, blister, skin breakdown, erythema, warmth or callus.      Toenail Condition: Right toenails are abnormally thick and long. ingrownFungal disease present.     Left foot:      Skin integrity: Dry skin present. No ulcer, blister, skin breakdown, erythema, warmth or callus.      Toenail Condition: Left toenails are abnormally thick and long. ingrownFungal disease present.     Comments: Rash area over the medial foot worse on the left. There is erythema and a little purple on the edge. There are punctate reddish brown lesions dotting the central area of the left lesion.    Lymphadenopathy:      Cervical: No cervical adenopathy.   Skin:     General: Skin is dry.      Coloration: Skin is not jaundiced or pale.      Findings: Erythema and rash present.   Neurological:      General: No focal deficit present.      Mental Status: She is alert and oriented to person, place, and time.      Motor: No abnormal muscle tone.   Psychiatric:         Mood and Affect: Mood normal.         Behavior: Behavior normal.        Result Review :                   Assessment and Plan   Diagnoses and all orders for this visit:    1. Annual physical exam (Primary)    2. Type 2 diabetes mellitus with hyperglycemia, without long-term current use of insulin (HCC)  -     dapagliflozin Propanediol (Farxiga) 10 MG tablet; Take 10 mg by mouth Daily.  Dispense: 90 tablet; Refill: 1    3. Essential hypertension    4. Mixed hyperlipidemia    5. Encounter for screening mammogram for malignant neoplasm of breast  -     Mammo Screening Bilateral With CAD; Future    6. Colon cancer screening  -     Cologuard - Stool, Per Rectum; Future    7. Tobacco abuse  -      CT Chest Low Dose Cancer Screening WO; Future    8. Screening for cervical cancer  -     IgP, Aptima HPV; Future  -     IgP, Aptima HPV    Other orders  -     Liraglutide (Victoza) 18 MG/3ML solution pen-injector injection; Inject 1.2 mg under the skin into the appropriate area as directed Daily.  Dispense: 18 mL; Refill: 1  -     metFORMIN (GLUCOPHAGE) 500 MG tablet; Take 2 tablets by mouth 2 (Two) Times a Day.  Dispense: 360 tablet; Refill: 1             Follow Up   No follow-ups on file.  Patient was given instructions and counseling regarding her condition or for health maintenance advice. Please see specific information pulled into the AVS if appropriate.     Preventive counseling  She is due for colon and breast cancer.   Ordered.   We did her Pap smear today.   She is not having any change in vaginal discharge or having any vaginal bleeding.   We talked  about the lung cancer screening and she would like to pursue.   Due for blood work already ordered.   If diabetes needs better control would increase the victoza to help with weight loss.   Refilled medications as is for now.   We talked about foot skin care and dry skin.

## 2023-01-29 DIAGNOSIS — E78.1 HYPERTRIGLYCERIDEMIA: ICD-10-CM

## 2023-01-29 DIAGNOSIS — E11.65 TYPE 2 DIABETES MELLITUS WITH HYPERGLYCEMIA, WITHOUT LONG-TERM CURRENT USE OF INSULIN: Primary | ICD-10-CM

## 2023-02-05 RX ORDER — FENOFIBRATE 145 MG/1
145 TABLET, COATED ORAL
Qty: 90 TABLET | Refills: 1 | Status: SHIPPED | OUTPATIENT
Start: 2023-02-05

## 2023-02-14 RX ORDER — SOLIFENACIN SUCCINATE 10 MG/1
TABLET, FILM COATED ORAL
Qty: 90 TABLET | Refills: 0 | Status: SHIPPED | OUTPATIENT
Start: 2023-02-14

## 2023-02-14 RX ORDER — ICOSAPENT ETHYL 1000 MG/1
CAPSULE ORAL
Qty: 360 CAPSULE | Refills: 2 | Status: SHIPPED | OUTPATIENT
Start: 2023-02-14

## 2023-02-14 RX ORDER — LISINOPRIL 5 MG/1
TABLET ORAL
Qty: 90 TABLET | Refills: 0 | Status: SHIPPED | OUTPATIENT
Start: 2023-02-14

## 2023-03-10 ENCOUNTER — HOSPITAL ENCOUNTER (OUTPATIENT)
Dept: CT IMAGING | Facility: HOSPITAL | Age: 53
Discharge: HOME | End: 2023-03-10
Admitting: FAMILY MEDICINE
Payer: COMMERCIAL

## 2023-03-10 DIAGNOSIS — Z72.0 TOBACCO ABUSE: ICD-10-CM

## 2023-03-10 PROCEDURE — 71271 CT THORAX LUNG CANCER SCR C-: CPT

## 2023-05-16 RX ORDER — SOLIFENACIN SUCCINATE 10 MG/1
TABLET, FILM COATED ORAL
Qty: 90 TABLET | Refills: 0 | OUTPATIENT
Start: 2023-05-16

## 2023-05-16 RX ORDER — LISINOPRIL 5 MG/1
TABLET ORAL
Qty: 90 TABLET | Refills: 0 | OUTPATIENT
Start: 2023-05-16

## 2023-05-30 DIAGNOSIS — E11.9 CONTROLLED TYPE 2 DIABETES MELLITUS WITH INSULIN THERAPY: ICD-10-CM

## 2023-05-30 DIAGNOSIS — Z79.4 CONTROLLED TYPE 2 DIABETES MELLITUS WITH INSULIN THERAPY: ICD-10-CM

## 2023-05-31 RX ORDER — PEN NEEDLE, DIABETIC 29 G X1/2"
NEEDLE, DISPOSABLE MISCELLANEOUS
Qty: 90 EACH | Refills: 3 | OUTPATIENT
Start: 2023-05-31

## 2023-07-24 ENCOUNTER — TELEPHONE (OUTPATIENT)
Dept: INTERNAL MEDICINE | Facility: CLINIC | Age: 53
End: 2023-07-24

## 2023-07-24 NOTE — TELEPHONE ENCOUNTER
Caller: Yu Sheffield    Relationship: Self    Best call back number:     What is the best time to reach you:    Who are you requesting to speak with (clinical staff, provider,  specific staff member):     Do you know the name of the person who called:     What was the call regarding: PATIENT IS CALLING IN STATING THAT HER MOM IS A CURRENT PATIENT OF DR ROSAS.  SHE WANTS TO BECOME A NEW PATIENT OF DR ROSAS'S AS WELL.  PATIENT SAYS THAT DR ROSAS INFORMED HER MOTHER THAT SHE WOULD TAKE HER ON AS A NEW PATIENT .  SHE WANTS TO BE CALLED REGARDING THIS.     Is it okay if the provider responds through [facility]:

## 2023-09-07 ENCOUNTER — OFFICE VISIT (OUTPATIENT)
Dept: INTERNAL MEDICINE | Facility: CLINIC | Age: 53
End: 2023-09-07
Payer: COMMERCIAL

## 2023-09-07 VITALS
WEIGHT: 269.2 LBS | BODY MASS INDEX: 38.54 KG/M2 | SYSTOLIC BLOOD PRESSURE: 126 MMHG | DIASTOLIC BLOOD PRESSURE: 76 MMHG | HEIGHT: 70 IN | OXYGEN SATURATION: 96 % | HEART RATE: 85 BPM

## 2023-09-07 DIAGNOSIS — M70.61 TROCHANTERIC BURSITIS OF RIGHT HIP: Primary | ICD-10-CM

## 2023-09-07 DIAGNOSIS — E78.2 MIXED HYPERLIPIDEMIA: ICD-10-CM

## 2023-09-07 DIAGNOSIS — N32.81 OVERACTIVE BLADDER: Chronic | ICD-10-CM

## 2023-09-07 DIAGNOSIS — Z99.89 OBSTRUCTIVE SLEEP APNEA ON CPAP: ICD-10-CM

## 2023-09-07 DIAGNOSIS — E11.65 TYPE 2 DIABETES MELLITUS WITH HYPERGLYCEMIA, WITHOUT LONG-TERM CURRENT USE OF INSULIN: ICD-10-CM

## 2023-09-07 DIAGNOSIS — G47.33 OBSTRUCTIVE SLEEP APNEA ON CPAP: ICD-10-CM

## 2023-09-07 PROBLEM — I10 ESSENTIAL HYPERTENSION: Chronic | Status: ACTIVE | Noted: 2018-08-28

## 2023-09-07 PROCEDURE — 99214 OFFICE O/P EST MOD 30 MIN: CPT | Performed by: NURSE PRACTITIONER

## 2023-09-07 RX ORDER — LISINOPRIL 5 MG/1
5 TABLET ORAL DAILY
Qty: 90 TABLET | Refills: 1 | Status: SHIPPED | OUTPATIENT
Start: 2023-09-07

## 2023-09-07 RX ORDER — FENOFIBRATE 145 MG/1
145 TABLET, COATED ORAL
Qty: 90 TABLET | Refills: 1 | Status: SHIPPED | OUTPATIENT
Start: 2023-09-07

## 2023-09-07 RX ORDER — DAPAGLIFLOZIN 10 MG/1
1 TABLET, FILM COATED ORAL DAILY
Qty: 90 TABLET | Refills: 1 | Status: SHIPPED | OUTPATIENT
Start: 2023-09-07

## 2023-09-07 RX ORDER — SOLIFENACIN SUCCINATE 10 MG/1
10 TABLET, FILM COATED ORAL DAILY
Qty: 90 TABLET | Refills: 1 | Status: SHIPPED | OUTPATIENT
Start: 2023-09-07

## 2023-09-07 NOTE — PROGRESS NOTES
Subjective   Yu Sheffield is a 53 y.o. female.     History of Present Illness   Yu Sheffield is a 53-year-old female who presents today to establish care. She was previously under the care of Dr. Antonieta Garcia.    Patient presents to establish care. Previous medical history discussed with patient in details and reflected in problem list.    The patient recently had trochanteric bursitis of her right hip. She was seen by orthopedic specialist Dr. Jonh Waller on 07/25/2023 and was referred to physical therapy at that time. She was also prescribed Voltaren 75 mg. She states that her last physical therapy appointment took place yesterday, 09/06/2023. She reports that the physical therapy helped alleviate her symptoms, and states that her right hip pain has resolved. She denies having any prior issues with her hip.    She confirms that she is taking Vascepa for her triglycerides. She is taking pravastatin for her cholesterol. She confirms that she is also taking fenofibrate.    The patient is taking Vesicare 10 mg for her overactive bladder. She states that the medication was initially working well for her; however, she states that she has been experiencing symptoms for approximately 2 to 3 months.    Her hemoglobin A1c level was noted to be 8.5% on 01/2023. She is taking 2 metformin 500 mg tablets twice a day. She is also taking Victoza and Farxiga; however, she states that she depleted her Victoza supply 2 weeks ago and her Farxiga supply approximately 3 weeks ago. She reports experiencing decreased mental clarity. She denies experiencing any paresthesia of her bilateral feet.    The patient has obstructive sleep apnea and uses a CPAP machine. She reports that she is sleeping well.    Her blood pressure is controlled today. She is taking lisinopril. She denies any dizziness, lightheadedness, dyspnea, or edema.    The patient's most recent bilateral screening mammogram was completed on 11/2021. She states that she  usually has her screening mammograms completed at [facility]'s mobile mammogram bus. She mentions that she did not have her eye exam completed this year. She denies any heartburn or dyspepsia. She confirms that her bowel movements are regular.    The following portions of the patient's history were reviewed and updated as appropriate: allergies, current medications, past family history, past medical history, past social history, past surgical history and problem list.    Review of Systems    Objective   Physical Exam  Constitutional:       Appearance: She is well-developed. She is not ill-appearing.   HENT:      Head: Normocephalic.      Right Ear: Hearing, tympanic membrane and external ear normal.      Left Ear: Hearing, tympanic membrane and external ear normal.      Nose: Nose normal. No nasal deformity, mucosal edema or rhinorrhea.      Right Sinus: No maxillary sinus tenderness or frontal sinus tenderness.      Left Sinus: No maxillary sinus tenderness or frontal sinus tenderness.      Mouth/Throat:      Dentition: Normal dentition.   Eyes:      General: Lids are normal.         Right eye: No discharge.         Left eye: No discharge.      Conjunctiva/sclera: Conjunctivae normal.      Right eye: No exudate.     Left eye: No exudate.  Neck:      Thyroid: No thyroid mass or thyromegaly.      Vascular: No carotid bruit.      Trachea: Trachea normal.   Cardiovascular:      Rate and Rhythm: Regular rhythm.      Pulses: Normal pulses.      Heart sounds: Murmur heard.    with a grade of 2/6.   Pulmonary:      Effort: No respiratory distress.      Breath sounds: Normal breath sounds. No decreased breath sounds, wheezing, rhonchi or rales.   Abdominal:      General: Bowel sounds are normal.      Palpations: Abdomen is soft.      Tenderness: There is no abdominal tenderness.   Musculoskeletal:      Cervical back: Normal range of motion. No edema.   Lymphadenopathy:      Head:      Right side of head: No  submental, submandibular, tonsillar, preauricular, posterior auricular or occipital adenopathy.      Left side of head: No submental, submandibular, tonsillar, preauricular, posterior auricular or occipital adenopathy.   Skin:     General: Skin is warm and dry.      Nails: There is no clubbing.   Neurological:      Mental Status: She is alert and oriented to person, place, and time.   Psychiatric:         Behavior: Behavior is cooperative.       Assessment & Plan   Diagnoses and all orders for this visit:    1. Trochanteric bursitis of right hip (Primary)  Assessment & Plan:  Sx improved since starting PT, managed on Diclofenac as needed      2. Mixed hyperlipidemia  Assessment & Plan:  The patient will continue taking Vascepa, pravastatin, and fenofibrate (denies myalgias).  A lipid panel test will be done.    Orders:  -     fenofibrate (Tricor) 145 MG tablet; Take 1 tablet by mouth Daily With Dinner.  Dispense: 90 tablet; Refill: 1    3. Overactive bladder  Assessment & Plan:  Sx managed on Vesicare, tolerating well.    Orders:  -     solifenacin (VESICARE) 10 MG tablet; Take 1 tablet by mouth Daily.  Dispense: 90 tablet; Refill: 1    4. Type 2 diabetes mellitus with hyperglycemia, without long-term current use of insulin  Assessment & Plan:  The patient will continue taking metformin, Victoza, and Farxiga as directed.  Her hemoglobin A1c levels will be assessed, she has been off medication for the past 3-4 weeks.  We discussed switching Victoza to Mounjaro pending lab results for further glucose lowering and to help with weight loss.    Orders:  -     dapagliflozin Propanediol (Farxiga) 10 MG tablet; Take 10 mg by mouth Daily.  Dispense: 90 tablet; Refill: 1  -     Hemoglobin A1c  -     Comprehensive Metabolic Panel  -     CBC (No Diff)  -     Lipid Panel  -     TSH  -     metFORMIN (GLUCOPHAGE) 500 MG tablet; Take 2 tablets by mouth 2 (Two) Times a Day.  Dispense: 360 tablet; Refill: 1  -     lisinopril  (PRINIVIL,ZESTRIL) 5 MG tablet; Take 1 tablet by mouth Daily.  Dispense: 90 tablet; Refill: 1    5. Obstructive sleep apnea on CPAP  Assessment & Plan:  She is using her CPAP nightly.      She will schedule her screening mammogram.       Transcribed from ambient dictation for RICHARD Joe by Jory Kay.  09/07/23   15:16 EDT    Patient or patient representative verbalized consent to the visit recording.  I have personally performed the services described in this document as transcribed by the above individual, and it is both accurate and complete.

## 2023-09-07 NOTE — ASSESSMENT & PLAN NOTE
The patient will continue taking Vascepa, pravastatin, and fenofibrate (denies myalgias).  A lipid panel test will be done.

## 2023-09-07 NOTE — ASSESSMENT & PLAN NOTE
The patient will continue taking metformin, Victoza, and Farxiga as directed.  Her hemoglobin A1c levels will be assessed, she has been off medication for the past 3-4 weeks.  We discussed switching Victoza to Mounjaro pending lab results for further glucose lowering and to help with weight loss.

## 2023-09-08 LAB
ALBUMIN SERPL-MCNC: 4.5 G/DL (ref 3.5–5.2)
ALBUMIN/GLOB SERPL: 1.7 G/DL
ALP SERPL-CCNC: 61 U/L (ref 39–117)
ALT SERPL-CCNC: 28 U/L (ref 1–33)
AST SERPL-CCNC: 17 U/L (ref 1–32)
BILIRUB SERPL-MCNC: 0.5 MG/DL (ref 0–1.2)
BUN SERPL-MCNC: 16 MG/DL (ref 6–20)
BUN/CREAT SERPL: 24.2 (ref 7–25)
CALCIUM SERPL-MCNC: 9.6 MG/DL (ref 8.6–10.5)
CHLORIDE SERPL-SCNC: 98 MMOL/L (ref 98–107)
CHOLEST SERPL-MCNC: 238 MG/DL (ref 0–200)
CO2 SERPL-SCNC: 23.5 MMOL/L (ref 22–29)
CREAT SERPL-MCNC: 0.66 MG/DL (ref 0.57–1)
EGFRCR SERPLBLD CKD-EPI 2021: 104.4 ML/MIN/1.73
ERYTHROCYTE [DISTWIDTH] IN BLOOD BY AUTOMATED COUNT: 13.7 % (ref 12.3–15.4)
GLOBULIN SER CALC-MCNC: 2.7 GM/DL
GLUCOSE SERPL-MCNC: 155 MG/DL (ref 65–99)
HBA1C MFR BLD: 10.2 % (ref 4.8–5.6)
HCT VFR BLD AUTO: 41.5 % (ref 34–46.6)
HDLC SERPL-MCNC: 27 MG/DL (ref 40–60)
HGB BLD-MCNC: 14.1 G/DL (ref 12–15.9)
LDLC SERPL CALC-MCNC: ABNORMAL MG/DL
MCH RBC QN AUTO: 33.3 PG (ref 26.6–33)
MCHC RBC AUTO-ENTMCNC: 34 G/DL (ref 31.5–35.7)
MCV RBC AUTO: 97.9 FL (ref 79–97)
PLATELET # BLD AUTO: 234 10*3/MM3 (ref 140–450)
POTASSIUM SERPL-SCNC: 4.3 MMOL/L (ref 3.5–5.2)
PROT SERPL-MCNC: 7.2 G/DL (ref 6–8.5)
RBC # BLD AUTO: 4.24 10*6/MM3 (ref 3.77–5.28)
SODIUM SERPL-SCNC: 136 MMOL/L (ref 136–145)
TRIGL SERPL-MCNC: 924 MG/DL (ref 0–150)
TSH SERPL DL<=0.005 MIU/L-ACNC: 2.41 UIU/ML (ref 0.27–4.2)
VLDLC SERPL CALC-MCNC: ABNORMAL MG/DL
WBC # BLD AUTO: 8.28 10*3/MM3 (ref 3.4–10.8)

## 2023-09-14 ENCOUNTER — PATIENT ROUNDING (BHMG ONLY) (OUTPATIENT)
Dept: INTERNAL MEDICINE | Facility: CLINIC | Age: 53
End: 2023-09-14
Payer: COMMERCIAL

## 2023-09-19 DIAGNOSIS — E11.65 TYPE 2 DIABETES MELLITUS WITH HYPERGLYCEMIA, WITHOUT LONG-TERM CURRENT USE OF INSULIN: Primary | ICD-10-CM

## 2023-09-19 RX ORDER — TIRZEPATIDE 2.5 MG/.5ML
2.5 INJECTION, SOLUTION SUBCUTANEOUS WEEKLY
Qty: 2 ML | Refills: 0 | Status: SHIPPED | OUTPATIENT
Start: 2023-09-19

## 2023-09-24 PROBLEM — Z99.89 OBSTRUCTIVE SLEEP APNEA ON CPAP: Chronic | Status: ACTIVE | Noted: 2023-09-07

## 2023-09-24 PROBLEM — M70.61 TROCHANTERIC BURSITIS OF RIGHT HIP: Chronic | Status: ACTIVE | Noted: 2023-09-24

## 2023-09-24 PROBLEM — G47.33 OBSTRUCTIVE SLEEP APNEA ON CPAP: Chronic | Status: ACTIVE | Noted: 2023-09-07

## 2023-09-24 PROBLEM — M70.61 TROCHANTERIC BURSITIS OF RIGHT HIP: Status: ACTIVE | Noted: 2023-09-24

## 2023-10-11 DIAGNOSIS — E11.65 TYPE 2 DIABETES MELLITUS WITH HYPERGLYCEMIA, WITHOUT LONG-TERM CURRENT USE OF INSULIN: ICD-10-CM

## 2023-10-12 RX ORDER — TIRZEPATIDE 2.5 MG/.5ML
2.5 INJECTION, SOLUTION SUBCUTANEOUS WEEKLY
Qty: 2 ML | Refills: 0 | Status: SHIPPED | OUTPATIENT
Start: 2023-10-12

## 2023-10-12 NOTE — TELEPHONE ENCOUNTER
Please advise   Patient Comment: Will you refill same dose?  You had mentioned gradually raising the dosage.  I have one shot left for this week.     Next visit 12/12/23

## 2023-11-09 ENCOUNTER — PATIENT MESSAGE (OUTPATIENT)
Dept: INTERNAL MEDICINE | Facility: CLINIC | Age: 53
End: 2023-11-09
Payer: COMMERCIAL

## 2023-11-09 DIAGNOSIS — E11.65 TYPE 2 DIABETES MELLITUS WITH HYPERGLYCEMIA, WITHOUT LONG-TERM CURRENT USE OF INSULIN: ICD-10-CM

## 2023-11-09 RX ORDER — TIRZEPATIDE 5 MG/.5ML
5 INJECTION, SOLUTION SUBCUTANEOUS WEEKLY
Qty: 2 ML | Refills: 1 | Status: SHIPPED | OUTPATIENT
Start: 2023-11-09

## 2023-11-09 RX ORDER — TIRZEPATIDE 2.5 MG/.5ML
2.5 INJECTION, SOLUTION SUBCUTANEOUS WEEKLY
Qty: 2 ML | Refills: 0 | Status: CANCELLED | OUTPATIENT
Start: 2023-11-09

## 2023-11-09 RX ORDER — PRAVASTATIN SODIUM 40 MG
40 TABLET ORAL DAILY
Qty: 90 TABLET | Refills: 1 | Status: SHIPPED | OUTPATIENT
Start: 2023-11-09

## 2023-11-09 NOTE — TELEPHONE ENCOUNTER
From: Yu Sheffield  To: Merline Tillman  Sent: 11/9/2023 8:01 AM EST  Subject: previous prescription    Good morning,  I had a prescription from my last doctor is due to deliver, but needs a new prescription. It will not let me select it on [id number]. Can you send the prescription to [facility] for pravastatin 40 MG tablet.  Thank you!  Yu

## 2023-12-04 ENCOUNTER — PATIENT MESSAGE (OUTPATIENT)
Dept: INTERNAL MEDICINE | Facility: CLINIC | Age: 53
End: 2023-12-04
Payer: COMMERCIAL

## 2023-12-04 DIAGNOSIS — E11.65 TYPE 2 DIABETES MELLITUS WITH HYPERGLYCEMIA, WITHOUT LONG-TERM CURRENT USE OF INSULIN: ICD-10-CM

## 2023-12-04 NOTE — TELEPHONE ENCOUNTER
From: Yu Sheffield  To: Merline Tillman  Sent: 2023 11:35 AM EST  Subject: Prescription for Vascepa    Prescription for VASCEPA 1GM CAP from previous doctor has  at [facility]. Can you initiate new prescription for it?

## 2023-12-05 RX ORDER — ICOSAPENT ETHYL 1000 MG/1
CAPSULE ORAL
Qty: 360 CAPSULE | Refills: 1 | Status: SHIPPED | OUTPATIENT
Start: 2023-12-05

## 2023-12-12 ENCOUNTER — OFFICE VISIT (OUTPATIENT)
Dept: INTERNAL MEDICINE | Facility: CLINIC | Age: 53
End: 2023-12-12
Payer: COMMERCIAL

## 2023-12-12 VITALS
WEIGHT: 260.8 LBS | DIASTOLIC BLOOD PRESSURE: 66 MMHG | HEART RATE: 86 BPM | SYSTOLIC BLOOD PRESSURE: 120 MMHG | OXYGEN SATURATION: 96 % | BODY MASS INDEX: 37.34 KG/M2 | HEIGHT: 70 IN

## 2023-12-12 DIAGNOSIS — E78.2 MIXED HYPERLIPIDEMIA: Chronic | ICD-10-CM

## 2023-12-12 DIAGNOSIS — E11.65 TYPE 2 DIABETES MELLITUS WITH HYPERGLYCEMIA, WITHOUT LONG-TERM CURRENT USE OF INSULIN: Primary | Chronic | ICD-10-CM

## 2023-12-12 DIAGNOSIS — I10 ESSENTIAL HYPERTENSION: Chronic | ICD-10-CM

## 2023-12-12 PROCEDURE — 99214 OFFICE O/P EST MOD 30 MIN: CPT | Performed by: NURSE PRACTITIONER

## 2023-12-12 NOTE — ASSESSMENT & PLAN NOTE
Her triglycerides were elevated last time (974) but she had been off fenofibrate and Vascepa. Recheck lipid panel today.

## 2023-12-12 NOTE — PROGRESS NOTES
"Chief Complaint  Diabetes (3 month follow up), Hypertension, and Hyperlipidemia  Subjective        Yu Sheffield presents to [facility]  History of Present Illness    The patient is a 53-year-old female who presents today for follow-up regarding diabetes, HTN and hyperlipidemia.    Victoza was switched to Mounjaro at her last visit which she is tolerating well. She also  takes 4 metformin and Farxiga daily. She has lost about 10 pounds. She denies any side effects from the Mounjaro.  She denies any upset stomach, headaches, dizziness, or lightheadedness. She denies any dyspnea. She has occasional diarrhea with the metformin, but it is tolerable. She is sleeping well at night. She saw her eye doctor on 12/08/2023 and was informed there was no damage from diabetes. She has not had a mammogram recently.    She did physical therapy for her hip. She has been diligent about doing the exercises and has not had any problems since.    Objective   Vital Signs:  /66 (BP Location: Left arm, Patient Position: Sitting, Cuff Size: Large Adult)   Pulse 86   Ht 177.8 cm (70\")   Wt 118 kg (260 lb 12.8 oz)   SpO2 96%   BMI 37.42 kg/m²   Estimated body mass index is 37.42 kg/m² as calculated from the following:    Height as of this encounter: 177.8 cm (70\").    Weight as of this encounter: 118 kg (260 lb 12.8 oz).            Physical Exam  Constitutional:       General: She is not in acute distress.     Appearance: Normal appearance. She is not diaphoretic.   HENT:      Head: Normocephalic and atraumatic.      Right Ear: Tympanic membrane, ear canal and external ear normal.      Left Ear: Tympanic membrane, ear canal and external ear normal.      Nose: Nose normal. No rhinorrhea.      Mouth/Throat:      Mouth: Mucous membranes are moist.      Pharynx: Oropharynx is clear.   Eyes:      General:         Right eye: No discharge.         Left eye: No discharge.      Conjunctiva/sclera: Conjunctivae " normal.   Cardiovascular:      Rate and Rhythm: Normal rate and regular rhythm.      Pulses: Normal pulses.      Heart sounds: Normal heart sounds.   Pulmonary:      Effort: Pulmonary effort is normal.      Breath sounds: Normal breath sounds.   Abdominal:      General: Bowel sounds are normal.      Tenderness: There is no abdominal tenderness.   Musculoskeletal:         General: No swelling or tenderness.      Cervical back: Normal range of motion.   Skin:     General: Skin is warm and dry.   Neurological:      General: No focal deficit present.      Mental Status: She is alert and oriented to person, place, and time.   Psychiatric:         Mood and Affect: Mood normal.         Behavior: Behavior normal.         Judgment: Judgment normal.        Result Review :  The following data was reviewed by: RICHARD Joe on 12/12/2023:  Common labs          1/27/2023    15:50 9/7/2023    12:22   Common Labs   Glucose 150  155    BUN 13  16    Creatinine 0.77  0.66    Sodium 136  136    Potassium 4.5  4.3    Chloride 98  98    Calcium 9.7  9.6    Total Protein 7.6  7.2    Albumin 5.1  4.5    Total Bilirubin 0.5  0.5    Alkaline Phosphatase 65  61    AST (SGOT) 17  17    ALT (SGPT) 25  28    WBC 9.90  8.28    Hemoglobin 14.9  14.1    Hematocrit 44.1  41.5    Platelets 302  234    Total Cholesterol 214  238    Triglycerides 591  924    HDL Cholesterol 37  27    LDL Cholesterol  82  Comment    Hemoglobin A1C 8.50  10.20                    Assessment and Plan   Diagnoses and all orders for this visit:    1. Type 2 diabetes mellitus with hyperglycemia, without long-term current use of insulin (Primary)  Assessment & Plan:  A1c. 10.2 with 9/2023 labs; now managed on Mounjaro, Metformin and Farxiga daily. Continue a low-carb, low-sugar diet.      Orders:  -     Hemoglobin A1c  -     Comprehensive Metabolic Panel    2. Essential hypertension  Assessment & Plan:  BP is well-controlled on lisinopril which he will continue a  low sodium diet.      3. Mixed hyperlipidemia  Assessment & Plan:  Her triglycerides were elevated last time (974) but she had been off fenofibrate and Vascepa. Recheck lipid panel today.    Orders:  -     Lipid Panel    She is overdue a screening mammogram which she will schedule.       Follow Up   Return in about 3 months (around 3/12/2024).  Patient was given instructions and counseling regarding her condition or for health maintenance advice. Please see specific information pulled into the AVS if appropriate.       Transcribed from ambient dictation for RICHARD Joe by Joya Hodge.  12/12/23   13:28 EST    Patient or patient representative verbalized consent to the visit recording.  I have personally performed the services described in this document as transcribed by the above individual, and it is both accurate and complete.

## 2023-12-12 NOTE — ASSESSMENT & PLAN NOTE
A1c. 10.2 with 9/2023 labs; now managed on Mounjaro, Metformin and Farxiga daily. Continue a low-carb, low-sugar diet.

## 2023-12-13 LAB
ALBUMIN SERPL-MCNC: 4.8 G/DL (ref 3.5–5.2)
ALBUMIN/GLOB SERPL: 2.1 G/DL
ALP SERPL-CCNC: 54 U/L (ref 39–117)
ALT SERPL-CCNC: 20 U/L (ref 1–33)
AST SERPL-CCNC: 15 U/L (ref 1–32)
BILIRUB SERPL-MCNC: 0.4 MG/DL (ref 0–1.2)
BUN SERPL-MCNC: 15 MG/DL (ref 6–20)
BUN/CREAT SERPL: 21.7 (ref 7–25)
CALCIUM SERPL-MCNC: 9.9 MG/DL (ref 8.6–10.5)
CHLORIDE SERPL-SCNC: 99 MMOL/L (ref 98–107)
CHOLEST SERPL-MCNC: 194 MG/DL (ref 0–200)
CO2 SERPL-SCNC: 21.7 MMOL/L (ref 22–29)
CREAT SERPL-MCNC: 0.69 MG/DL (ref 0.57–1)
EGFRCR SERPLBLD CKD-EPI 2021: 103.3 ML/MIN/1.73
GLOBULIN SER CALC-MCNC: 2.3 GM/DL
GLUCOSE SERPL-MCNC: 176 MG/DL (ref 65–99)
HBA1C MFR BLD: 8.6 % (ref 4.8–5.6)
HDLC SERPL-MCNC: 32 MG/DL (ref 40–60)
LDLC SERPL CALC-MCNC: 103 MG/DL (ref 0–100)
POTASSIUM SERPL-SCNC: 4.5 MMOL/L (ref 3.5–5.2)
PROT SERPL-MCNC: 7.1 G/DL (ref 6–8.5)
SODIUM SERPL-SCNC: 135 MMOL/L (ref 136–145)
TRIGL SERPL-MCNC: 344 MG/DL (ref 0–150)
VLDLC SERPL CALC-MCNC: 59 MG/DL (ref 5–40)

## 2024-01-30 DIAGNOSIS — E11.65 TYPE 2 DIABETES MELLITUS WITH HYPERGLYCEMIA, WITHOUT LONG-TERM CURRENT USE OF INSULIN: ICD-10-CM

## 2024-01-30 DIAGNOSIS — E11.65 TYPE 2 DIABETES MELLITUS WITH HYPERGLYCEMIA, WITHOUT LONG-TERM CURRENT USE OF INSULIN: Primary | Chronic | ICD-10-CM

## 2024-01-30 NOTE — TELEPHONE ENCOUNTER
----- Message from RICHARD Desir sent at 1/30/2024  7:19 AM EST -----  Please call patient and clarify if she is tolerating Mounjaro 5 mg weekly well. If so, I would like to increase her dose to 7.5 mg weekly with her next prescription. If she is agreeable let me know and I will send in the higher dose. She will need to monitor for GI side effects. Thanks.   [email or web address]

## 2024-01-30 NOTE — TELEPHONE ENCOUNTER
Attempted to call patient, LVM advising of results/call back per  verbal release     HUB TO READ  Merline states:  Please call patient and clarify if she is tolerating Mounjaro 5 mg weekly well. If so, I would like to increase her dose to 7.5 mg weekly with her next prescription. If she is agreeable let me know and I will send in the higher dose. She will need to monitor for GI side effects. Thanks.

## 2024-02-02 DIAGNOSIS — N32.81 OVERACTIVE BLADDER: Chronic | ICD-10-CM

## 2024-02-02 DIAGNOSIS — E11.65 TYPE 2 DIABETES MELLITUS WITH HYPERGLYCEMIA, WITHOUT LONG-TERM CURRENT USE OF INSULIN: ICD-10-CM

## 2024-02-02 RX ORDER — LISINOPRIL 5 MG/1
5 TABLET ORAL DAILY
Qty: 90 TABLET | Refills: 1 | Status: SHIPPED | OUTPATIENT
Start: 2024-02-02

## 2024-02-02 RX ORDER — DAPAGLIFLOZIN 10 MG/1
1 TABLET, FILM COATED ORAL DAILY
Qty: 90 TABLET | Refills: 1 | Status: SHIPPED | OUTPATIENT
Start: 2024-02-02

## 2024-02-02 RX ORDER — SOLIFENACIN SUCCINATE 10 MG/1
10 TABLET, FILM COATED ORAL DAILY
Qty: 90 TABLET | Refills: 1 | Status: SHIPPED | OUTPATIENT
Start: 2024-02-02

## 2024-02-12 DIAGNOSIS — E11.65 TYPE 2 DIABETES MELLITUS WITH HYPERGLYCEMIA, WITHOUT LONG-TERM CURRENT USE OF INSULIN: ICD-10-CM

## 2024-02-12 DIAGNOSIS — E78.2 MIXED HYPERLIPIDEMIA: ICD-10-CM

## 2024-02-12 RX ORDER — FENOFIBRATE 145 MG/1
145 TABLET, COATED ORAL
Qty: 90 TABLET | Refills: 1 | Status: SHIPPED | OUTPATIENT
Start: 2024-02-12

## 2024-03-06 DIAGNOSIS — E11.65 TYPE 2 DIABETES MELLITUS WITH HYPERGLYCEMIA, WITHOUT LONG-TERM CURRENT USE OF INSULIN: Chronic | ICD-10-CM

## 2024-03-12 ENCOUNTER — PATIENT MESSAGE (OUTPATIENT)
Dept: INTERNAL MEDICINE | Facility: CLINIC | Age: 54
End: 2024-03-12
Payer: COMMERCIAL

## 2024-03-12 DIAGNOSIS — E11.65 TYPE 2 DIABETES MELLITUS WITH HYPERGLYCEMIA, WITHOUT LONG-TERM CURRENT USE OF INSULIN: Chronic | ICD-10-CM

## 2024-03-12 NOTE — TELEPHONE ENCOUNTER
From: Yu Sheffiled  To: Merline Tillman  Sent: 3/12/2024 10:38 AM EDT  Subject: Mounjaro prescription     I had a [facility] from [facility] last week that  is out, but it still said  for today. Now today says on hold and contacted prescriber. What to do, wait? Call around to other pharmacy to see if they have? Thanks

## 2024-03-12 NOTE — TELEPHONE ENCOUNTER
Patient is having trouble finding Mounjaro. She is wondering if she can look for the next dose up, or should she hold off until current dose becomes available?

## 2024-03-20 ENCOUNTER — OFFICE VISIT (OUTPATIENT)
Dept: INTERNAL MEDICINE | Facility: CLINIC | Age: 54
End: 2024-03-20
Payer: COMMERCIAL

## 2024-03-20 VITALS
BODY MASS INDEX: 35.88 KG/M2 | DIASTOLIC BLOOD PRESSURE: 70 MMHG | WEIGHT: 250.6 LBS | OXYGEN SATURATION: 98 % | HEART RATE: 91 BPM | HEIGHT: 70 IN | SYSTOLIC BLOOD PRESSURE: 118 MMHG

## 2024-03-20 DIAGNOSIS — E78.2 MIXED HYPERLIPIDEMIA: Chronic | ICD-10-CM

## 2024-03-20 DIAGNOSIS — Z12.2 SCREENING FOR LUNG CANCER: ICD-10-CM

## 2024-03-20 DIAGNOSIS — Z72.0 TOBACCO USE: ICD-10-CM

## 2024-03-20 DIAGNOSIS — I10 ESSENTIAL HYPERTENSION: Chronic | ICD-10-CM

## 2024-03-20 DIAGNOSIS — Z00.00 PE (PHYSICAL EXAM), ANNUAL: Primary | ICD-10-CM

## 2024-03-20 DIAGNOSIS — E11.65 TYPE 2 DIABETES MELLITUS WITH HYPERGLYCEMIA, WITHOUT LONG-TERM CURRENT USE OF INSULIN: Chronic | ICD-10-CM

## 2024-03-20 DIAGNOSIS — N32.81 OVERACTIVE BLADDER: Chronic | ICD-10-CM

## 2024-03-20 PROCEDURE — 99396 PREV VISIT EST AGE 40-64: CPT | Performed by: NURSE PRACTITIONER

## 2024-03-21 LAB
ALBUMIN SERPL-MCNC: 4.6 G/DL (ref 3.8–4.9)
ALBUMIN/CREAT UR: 12 MG/G CREAT (ref 0–29)
ALBUMIN/GLOB SERPL: 1.6 {RATIO} (ref 1.2–2.2)
ALP SERPL-CCNC: 52 IU/L (ref 44–121)
ALT SERPL-CCNC: 16 IU/L (ref 0–32)
AST SERPL-CCNC: 10 IU/L (ref 0–40)
BILIRUB SERPL-MCNC: 0.2 MG/DL (ref 0–1.2)
BUN SERPL-MCNC: 14 MG/DL (ref 6–24)
BUN/CREAT SERPL: 21 (ref 9–23)
CALCIUM SERPL-MCNC: 9.5 MG/DL (ref 8.7–10.2)
CHLORIDE SERPL-SCNC: 103 MMOL/L (ref 96–106)
CO2 SERPL-SCNC: 20 MMOL/L (ref 20–29)
CREAT SERPL-MCNC: 0.68 MG/DL (ref 0.57–1)
CREAT UR-MCNC: 39.6 MG/DL
EGFRCR SERPLBLD CKD-EPI 2021: 103 ML/MIN/1.73
ERYTHROCYTE [DISTWIDTH] IN BLOOD BY AUTOMATED COUNT: 13.5 % (ref 11.7–15.4)
GLOBULIN SER CALC-MCNC: 2.8 G/DL (ref 1.5–4.5)
GLUCOSE SERPL-MCNC: 183 MG/DL (ref 70–99)
HBA1C MFR BLD: 8.7 % (ref 4.8–5.6)
HCT VFR BLD AUTO: 45 % (ref 34–46.6)
HGB BLD-MCNC: 15.4 G/DL (ref 11.1–15.9)
MCH RBC QN AUTO: 32.6 PG (ref 26.6–33)
MCHC RBC AUTO-ENTMCNC: 34.2 G/DL (ref 31.5–35.7)
MCV RBC AUTO: 95 FL (ref 79–97)
MICROALBUMIN UR-MCNC: 4.6 UG/ML
PLATELET # BLD AUTO: 296 X10E3/UL (ref 150–450)
POTASSIUM SERPL-SCNC: 4.4 MMOL/L (ref 3.5–5.2)
PROT SERPL-MCNC: 7.4 G/DL (ref 6–8.5)
RBC # BLD AUTO: 4.72 X10E6/UL (ref 3.77–5.28)
SODIUM SERPL-SCNC: 138 MMOL/L (ref 134–144)
TSH SERPL DL<=0.005 MIU/L-ACNC: 1.93 UIU/ML (ref 0.45–4.5)
WBC # BLD AUTO: 10.6 X10E3/UL (ref 3.4–10.8)

## 2024-03-23 PROBLEM — Z72.0 TOBACCO USE: Chronic | Status: ACTIVE | Noted: 2024-03-23

## 2024-03-23 PROBLEM — Z72.0 TOBACCO USE: Status: ACTIVE | Noted: 2024-03-23

## 2024-03-23 NOTE — PROGRESS NOTES
Esdras Sheffield is a 53 y.o. female who is here for a physical exam.    History of Present Illness   History of Present Illness  The patient is a 53-year-old female who presents for evaluation of multiple medical concerns.    She was unable to get Mounjaro due to a shortage, switched to Ozempic which she has only recently received.. She has lost 20 pounds since 09/2023. She denies any side effects such as nausea or upset stomach. She is not eating as much and notes decreased appetite. Mounjaro was started after her labs 12/12/23 showed an A1c of 8.6. She denies any chest tightness or shortness of breath. She felt her heart race when she took the first injection of Ozempic on Monday (since resolved). She sleeps well at night. She denies any heartburn or indigestion.    Supplemental Information  She was using Voltaren for her left knee knee pain. She saw ortho bursitis in the right hip and had physical therapy. He took her off the Voltaren.   She started smoking when she was 15 years old. She quit one time for about a year. She smokes a pack a day.      Past Medical History:   Diagnosis Date    Diabetes mellitus     Essential hypertension 8/28/2018    Hyperlipidemia     OAB (overactive bladder)     Osteoarthritis of knee          Current Outpatient Medications:     diclofenac (VOLTAREN) 75 MG EC tablet, Take 1 tablet by mouth 2 (Two) Times a Day., Disp: , Rfl:     Farxiga 10 MG tablet, TAKE 1 TABLET DAILY, Disp: 90 tablet, Rfl: 1    fenofibrate (TRICOR) 145 MG tablet, TAKE 1 TABLET DAILY WITH   DINNER, Disp: 90 tablet, Rfl: 1    icosapent ethyl (Vascepa) 1 g capsule capsule, TAKE 2 CAPSULES TWO TIMES DAILY WITH MEALS, Disp: 360 capsule, Rfl: 1    lisinopril (PRINIVIL,ZESTRIL) 5 MG tablet, TAKE 1 TABLET DAILY, Disp: 90 tablet, Rfl: 1    metFORMIN (GLUCOPHAGE) 500 MG tablet, TAKE 2 TABLETS TWICE A DAY, Disp: 360 tablet, Rfl: 1    pravastatin (PRAVACHOL) 40 MG tablet, Take 1 tablet by mouth Daily., Disp: 90  tablet, Rfl: 1    Semaglutide,0.25 or 0.5MG/DOS, (OZEMPIC) 2 MG/1.5ML solution pen-injector, Inject 0.25 mg under the skin into the appropriate area as directed 1 (One) Time Per Week., Disp: 1.5 mL, Rfl: 0    solifenacin (VESICARE) 10 MG tablet, TAKE 1 TABLET DAILY, Disp: 90 tablet, Rfl: 1    No Known Allergies    Review of Systems   Constitutional:  Negative for activity change, appetite change, chills, diaphoresis, fatigue, fever and unexpected weight change.   HENT:  Negative for congestion, dental problem, drooling, ear discharge, ear pain, facial swelling, hearing loss, mouth sores, nosebleeds, postnasal drip, rhinorrhea, sinus pressure, sore throat, tinnitus and trouble swallowing.    Eyes:  Negative for photophobia, pain, discharge, redness, itching and visual disturbance.   Respiratory:  Negative for apnea, cough, choking, chest tightness, shortness of breath and wheezing.    Cardiovascular:  Negative for chest pain, palpitations and leg swelling.        No orthopnea, PND, TOMLIN   Gastrointestinal:  Negative for abdominal pain, blood in stool, constipation, diarrhea, nausea and vomiting.   Endocrine: Negative for cold intolerance, heat intolerance, polydipsia and polyuria.   Genitourinary:  Negative for decreased urine volume, dysuria, enuresis, flank pain, frequency, hematuria and urgency.   Musculoskeletal:  Negative for arthralgias, back pain, gait problem, joint swelling, myalgias, neck pain and neck stiffness.   Skin:  Negative for color change and rash.        No hair changes, no nail changes   Allergic/Immunologic: Negative for environmental allergies, food allergies and immunocompromised state.   Neurological:  Negative for dizziness, tremors, seizures, syncope, speech difficulty, weakness, light-headedness, numbness and headaches.   Hematological:  Negative for adenopathy. Does not bruise/bleed easily.   Psychiatric/Behavioral:  Negative for agitation, confusion, decreased concentration, dysphoric  "mood, sleep disturbance and suicidal ideas. The patient is not nervous/anxious.        Objective   Vitals:    03/20/24 0826   BP: 118/70   BP Location: Left arm   Patient Position: Sitting   Cuff Size: Large Adult   Pulse: 91   SpO2: 98%   Weight: 114 kg (250 lb 9.6 oz)   Height: 177.8 cm (70\")     Physical Exam  Constitutional:       General: She is not in acute distress.     Appearance: Normal appearance. She is not diaphoretic.   HENT:      Head: Normocephalic and atraumatic.      Right Ear: Tympanic membrane, ear canal and external ear normal.      Left Ear: Tympanic membrane, ear canal and external ear normal.      Nose: Nose normal. No rhinorrhea.      Mouth/Throat:      Mouth: Mucous membranes are moist.      Pharynx: Oropharynx is clear.   Eyes:      General:         Right eye: No discharge.         Left eye: No discharge.      Conjunctiva/sclera: Conjunctivae normal.   Cardiovascular:      Rate and Rhythm: Normal rate and regular rhythm.      Pulses: Normal pulses.      Heart sounds: Normal heart sounds.   Pulmonary:      Effort: Pulmonary effort is normal.      Breath sounds: Normal breath sounds.   Abdominal:      General: Bowel sounds are normal.      Tenderness: There is no abdominal tenderness.   Musculoskeletal:         General: No swelling or tenderness.      Cervical back: Normal range of motion.   Skin:     General: Skin is warm and dry.   Neurological:      General: No focal deficit present.      Mental Status: She is alert and oriented to person, place, and time.   Psychiatric:         Mood and Affect: Mood normal.         Behavior: Behavior normal.         Judgment: Judgment normal.       Physical Exam  Vital Signs  Heart rate is 78.    Results  Laboratory Studies  Labs were reviewed with the patient.    Imaging  CT scan from 03/10/2023 was reviewed with the patient.       Assessment & Plan   Diagnoses and all orders for this visit:    1. PE (physical exam), annual (Primary)  -     Comprehensive " Metabolic Panel  -     CBC (No Diff)  -     TSH    2. Type 2 diabetes mellitus with hyperglycemia, without long-term current use of insulin  Assessment & Plan:  She was switched from Mounjaro to Ozempic due to availability, recheck A1c today. Continue Farxiga and metformin.    Orders:  -     Hemoglobin A1c  -     Microalbumin / Creatinine Urine Ratio - Urine, Clean Catch    3. Essential hypertension  Assessment & Plan:  Hypertension is stable and controlled  Continue current treatment regimen.  Blood pressure will be reassessed in 6 months.  Continue lisinopril daily.      4. Mixed hyperlipidemia  Assessment & Plan:   Lipid abnormalities are stable    Plan:  Continue same medication/s without change.      Discussed medication dosage, use, side effects, and goals of treatment in detail.    Counseled patient on lifestyle modifications to help control hyperlipidemia.   Continue pravastatin, fenofibrate and Vascepa daily.    Patient Treatment Goals:   LDL goal is less than 70    Followup in 6 months.      5. Overactive bladder  Assessment & Plan:  Sx managed on Vesicare daily, continue to monitor.      6. Tobacco use  Assessment & Plan:  She started smoking when she was 15 years old. She quit one time for about a year. She smokes a pack a day.    Orders:  -      CT Chest Low Dose Cancer Screening WO; Future    7. Screening for lung cancer  -      CT Chest Low Dose Cancer Screening WO; Future      Assessment & Plan  Diabetes.  She has lost 20 pounds since 09/2023. Her A1c was 8.6 in 12/2023. I will do 4 weeks of Mounjaro 0.25 mg, 4 weeks of 0.5 mg, and 4 weeks of 1 mg. She will send me a message when she is out of the 0.25 mg so that we know to refill the higher dose unless she is having side effects or upset stomach. If her A1c is still above 7, we probably need to look at a different medicine.    Lung cancer screening.  I will order a CT scan of the chest.      Risk Assessment:  Family History   Problem Relation Age of  Onset    Colonic polyp Mother 70    Ulcerative colitis Father     COPD Father     No Known Problems Brother      Her Body mass index is 35.96 kg/m²..  She is losing weight over the past few months which she is encouraged to continue.    Prevention:  Health Maintenance   Topic Date Due    Hepatitis B (1 of 3 - 19+ 3-dose series) Never done    ZOSTER VACCINE (1 of 2) Never done    MAMMOGRAM  11/22/2022    ANNUAL PHYSICAL  01/27/2024    DIABETIC FOOT EXAM  01/27/2024    LUNG CANCER SCREENING  03/10/2024    HEMOGLOBIN A1C  09/20/2024    LIPID PANEL  12/12/2024    DIABETIC EYE EXAM  12/17/2024    BMI FOLLOWUP  03/14/2025    URINE MICROALBUMIN  03/20/2025    PAP SMEAR  01/27/2026    COLORECTAL CANCER SCREENING  02/11/2026    TDAP/TD VACCINES (2 - Td or Tdap) 06/27/2031    Pneumococcal Vaccine 0-64 (3 of 3 - PPSV23 or PCV20) 08/11/2034    HEPATITIS C SCREENING  Completed    COVID-19 Vaccine  Completed    INFLUENZA VACCINE  Completed       Discussed healthy lifestyle choices such as maintaining a balanced diet low in carbohydrates and limiting caffeine and alcohol intake.  Recommended routine exercise for bone strength and cardiovascular health.         Patient or patient representative verbalized consent for the use of Ambient Listening during the visit with  RICHARD Joe for chart documentation. 3/23/2024  19:29 EDT

## 2024-03-23 NOTE — ASSESSMENT & PLAN NOTE
Hypertension is stable and controlled  Continue current treatment regimen.  Blood pressure will be reassessed in 6 months.  Continue lisinopril daily.

## 2024-03-23 NOTE — ASSESSMENT & PLAN NOTE
Lipid abnormalities are stable    Plan:  Continue same medication/s without change.      Discussed medication dosage, use, side effects, and goals of treatment in detail.    Counseled patient on lifestyle modifications to help control hyperlipidemia.   Continue pravastatin, fenofibrate and Vascepa daily.    Patient Treatment Goals:   LDL goal is less than 70    Followup in 6 months.

## 2024-03-23 NOTE — ASSESSMENT & PLAN NOTE
She was switched from Mounjaro to Ozempic due to availability, recheck A1c today. Continue Farxiga and metformin.

## 2024-03-23 NOTE — ASSESSMENT & PLAN NOTE
She started smoking when she was 15 years old. She quit one time for about a year. She smokes a pack a day.

## 2024-04-14 DIAGNOSIS — E11.65 TYPE 2 DIABETES MELLITUS WITH HYPERGLYCEMIA, WITHOUT LONG-TERM CURRENT USE OF INSULIN: Chronic | ICD-10-CM

## 2024-04-15 ENCOUNTER — PATIENT MESSAGE (OUTPATIENT)
Dept: INTERNAL MEDICINE | Facility: CLINIC | Age: 54
End: 2024-04-15
Payer: COMMERCIAL

## 2024-05-02 RX ORDER — ICOSAPENT ETHYL 1 G/1
CAPSULE ORAL
Qty: 360 CAPSULE | Refills: 1 | Status: SHIPPED | OUTPATIENT
Start: 2024-05-02

## 2024-05-02 RX ORDER — PRAVASTATIN SODIUM 40 MG
40 TABLET ORAL DAILY
Qty: 90 TABLET | Refills: 1 | Status: SHIPPED | OUTPATIENT
Start: 2024-05-02

## 2024-05-15 DIAGNOSIS — E11.65 TYPE 2 DIABETES MELLITUS WITH HYPERGLYCEMIA, WITHOUT LONG-TERM CURRENT USE OF INSULIN: Primary | Chronic | ICD-10-CM

## 2024-05-16 ENCOUNTER — HOSPITAL ENCOUNTER (OUTPATIENT)
Dept: CT IMAGING | Facility: HOSPITAL | Age: 54
Discharge: HOME | End: 2024-05-16
Admitting: NURSE PRACTITIONER
Payer: COMMERCIAL

## 2024-05-16 DIAGNOSIS — Z12.2 SCREENING FOR LUNG CANCER: ICD-10-CM

## 2024-05-16 DIAGNOSIS — Z72.0 TOBACCO USE: ICD-10-CM

## 2024-05-16 PROCEDURE — 71271 CT THORAX LUNG CANCER SCR C-: CPT

## 2024-05-22 ENCOUNTER — TELEPHONE (OUTPATIENT)
Dept: INTERNAL MEDICINE | Facility: CLINIC | Age: 54
End: 2024-05-22
Payer: COMMERCIAL

## 2024-05-22 NOTE — TELEPHONE ENCOUNTER
Attempted to call patient regarding recent Chest CT results, LVM requesting call back. Sent MyChart msg    Will post susanne for reminder to make sure patient saw foc.us    HUB TO READ  Recent CT chest did not show any acute abnormalities. We will repeat in 1 year as a screening for lung cancer per Merline.

## 2024-06-26 ENCOUNTER — OFFICE VISIT (OUTPATIENT)
Dept: INTERNAL MEDICINE | Facility: CLINIC | Age: 54
End: 2024-06-26
Payer: COMMERCIAL

## 2024-06-26 VITALS
SYSTOLIC BLOOD PRESSURE: 106 MMHG | DIASTOLIC BLOOD PRESSURE: 60 MMHG | WEIGHT: 244.4 LBS | HEIGHT: 70 IN | HEART RATE: 97 BPM | BODY MASS INDEX: 34.99 KG/M2 | OXYGEN SATURATION: 95 %

## 2024-06-26 DIAGNOSIS — E78.2 MIXED HYPERLIPIDEMIA: Chronic | ICD-10-CM

## 2024-06-26 DIAGNOSIS — E11.65 TYPE 2 DIABETES MELLITUS WITH HYPERGLYCEMIA, WITHOUT LONG-TERM CURRENT USE OF INSULIN: Primary | Chronic | ICD-10-CM

## 2024-06-26 DIAGNOSIS — I10 ESSENTIAL HYPERTENSION: Chronic | ICD-10-CM

## 2024-06-26 LAB
ALBUMIN SERPL-MCNC: 4.9 G/DL (ref 3.5–5.2)
ALBUMIN/GLOB SERPL: 1.8 G/DL
ALP SERPL-CCNC: 55 U/L (ref 39–117)
ALT SERPL-CCNC: 21 U/L (ref 1–33)
AST SERPL-CCNC: 12 U/L (ref 1–32)
BILIRUB SERPL-MCNC: 0.4 MG/DL (ref 0–1.2)
BUN SERPL-MCNC: 17 MG/DL (ref 6–20)
BUN/CREAT SERPL: 22.1 (ref 7–25)
CALCIUM SERPL-MCNC: 10.1 MG/DL (ref 8.6–10.5)
CHLORIDE SERPL-SCNC: 95 MMOL/L (ref 98–107)
CHOLEST SERPL-MCNC: 200 MG/DL (ref 0–200)
CO2 SERPL-SCNC: 24.1 MMOL/L (ref 22–29)
CREAT SERPL-MCNC: 0.77 MG/DL (ref 0.57–1)
EGFRCR SERPLBLD CKD-EPI 2021: 91.8 ML/MIN/1.73
ERYTHROCYTE [DISTWIDTH] IN BLOOD BY AUTOMATED COUNT: 13.2 % (ref 12.3–15.4)
GLOBULIN SER CALC-MCNC: 2.7 GM/DL
GLUCOSE SERPL-MCNC: 167 MG/DL (ref 65–99)
HBA1C MFR BLD: 8.7 % (ref 4.8–5.6)
HCT VFR BLD AUTO: 48.9 % (ref 34–46.6)
HDLC SERPL-MCNC: 32 MG/DL (ref 40–60)
HGB BLD-MCNC: 16.3 G/DL (ref 12–15.9)
LDLC SERPL CALC-MCNC: 102 MG/DL (ref 0–100)
MCH RBC QN AUTO: 31.6 PG (ref 26.6–33)
MCHC RBC AUTO-ENTMCNC: 33.3 G/DL (ref 31.5–35.7)
MCV RBC AUTO: 94.8 FL (ref 79–97)
PLATELET # BLD AUTO: 330 10*3/MM3 (ref 140–450)
POTASSIUM SERPL-SCNC: 4.7 MMOL/L (ref 3.5–5.2)
PROT SERPL-MCNC: 7.6 G/DL (ref 6–8.5)
RBC # BLD AUTO: 5.16 10*6/MM3 (ref 3.77–5.28)
SODIUM SERPL-SCNC: 137 MMOL/L (ref 136–145)
TRIGL SERPL-MCNC: 392 MG/DL (ref 0–150)
TSH SERPL DL<=0.005 MIU/L-ACNC: 2.15 UIU/ML (ref 0.27–4.2)
VLDLC SERPL CALC-MCNC: 66 MG/DL (ref 5–40)
WBC # BLD AUTO: 10.95 10*3/MM3 (ref 3.4–10.8)

## 2024-06-26 PROCEDURE — 99214 OFFICE O/P EST MOD 30 MIN: CPT | Performed by: NURSE PRACTITIONER

## 2024-07-02 DIAGNOSIS — E78.2 MIXED HYPERLIPIDEMIA: ICD-10-CM

## 2024-07-03 RX ORDER — FENOFIBRATE 145 MG/1
145 TABLET, COATED ORAL
Qty: 90 TABLET | Refills: 1 | Status: SHIPPED | OUTPATIENT
Start: 2024-07-03

## 2024-07-05 DIAGNOSIS — E11.65 TYPE 2 DIABETES MELLITUS WITH HYPERGLYCEMIA, WITHOUT LONG-TERM CURRENT USE OF INSULIN: ICD-10-CM

## 2024-07-07 NOTE — ASSESSMENT & PLAN NOTE
BP is low in the office today, currently managed on lisinopril daily. Monitor BP and call with low readings (may need to lower lisinopril dose).

## 2024-07-07 NOTE — ASSESSMENT & PLAN NOTE
She denies myalgias with fenofibrate and pravastatin which she will continue along with a low-fat, low-cholesterol diet.   Lab Results   Component Value Date     (H) 06/26/2024

## 2024-07-07 NOTE — PROGRESS NOTES
"Chief Complaint  Diabetes  Subjective        Yu Sheffield presents to [facility]  History of Present Illness  History of Present Illness  The patient is a 53-year-old female who presents for evaluation of multiple medical concerns.    The patient is currently on a regimen of Ozempic 1 mg, Farxiga, metformin, and glipizide. She reports no adverse effects from Ozempic, although she has experienced gastrointestinal discomfort approximately three times in the past three months, typically in the morning after consuming coffee. Sx resolves after bowel movements. She was previously on Mounjaro but due to lack of availability she was changed to Ozempic. Her A1c was elevated at 8.7 with 3/20/24 labs.    The patient is due for a mammogram. She reports no new health concerns. She denies experiencing headaches, shortness of breath, heartburn, indigestion, or constipation.     However, she has experienced episodes of dizziness and lightheadedness in the past three months, which resolve upon closing her eyes. She also reports right hip pain, which she manages with physical therapy exercises. Her sleep patterns are normal.    Objective   Vital Signs:  /60 (BP Location: Left arm, Patient Position: Sitting, Cuff Size: Large Adult)   Pulse 97   Ht 177.8 cm (70\")   Wt 111 kg (244 lb 6.4 oz)   SpO2 95%   BMI 35.07 kg/m²   Estimated body mass index is 35.07 kg/m² as calculated from the following:    Height as of this encounter: 177.8 cm (70\").    Weight as of this encounter: 111 kg (244 lb 6.4 oz).            Physical Exam  Constitutional:       Appearance: She is well-developed. She is not ill-appearing.   HENT:      Head: Normocephalic.      Right Ear: Hearing, tympanic membrane and external ear normal.      Left Ear: Hearing, tympanic membrane and external ear normal.      Nose: Nose normal. No nasal deformity, mucosal edema or rhinorrhea.      Right Sinus: No maxillary sinus tenderness or " frontal sinus tenderness.      Left Sinus: No maxillary sinus tenderness or frontal sinus tenderness.      Mouth/Throat:      Dentition: Normal dentition.      Pharynx: Posterior oropharyngeal erythema present.   Eyes:      General: Lids are normal.         Right eye: No discharge.         Left eye: No discharge.      Conjunctiva/sclera: Conjunctivae normal.      Right eye: No exudate.     Left eye: No exudate.  Neck:      Thyroid: No thyroid mass or thyromegaly.      Vascular: No carotid bruit.      Trachea: Trachea normal.   Cardiovascular:      Rate and Rhythm: Regular rhythm.      Pulses: Normal pulses.           Dorsalis pedis pulses are 2+ on the right side and 2+ on the left side.        Posterior tibial pulses are 2+ on the right side and 2+ on the left side.      Heart sounds: Normal heart sounds. No murmur heard.  Pulmonary:      Effort: No respiratory distress.      Breath sounds: Normal breath sounds. No decreased breath sounds, wheezing, rhonchi or rales.   Abdominal:      General: Bowel sounds are normal.      Palpations: Abdomen is soft.      Tenderness: There is no abdominal tenderness.   Musculoskeletal:      Cervical back: Normal range of motion. No edema.      Right foot: Normal range of motion.      Left foot: Normal range of motion.   Feet:      Right foot:      Protective Sensation: 10 sites tested.  10 sites sensed.      Skin integrity: Skin integrity normal. No ulcer, blister or skin breakdown.      Toenail Condition: Right toenails are normal.      Left foot:      Protective Sensation: 10 sites tested.  10 sites sensed.      Skin integrity: Skin integrity normal. No ulcer, blister or skin breakdown.      Toenail Condition: Left toenails are normal.      Comments: Diabetic Foot Exam Performed and Monofilament Test Performed    Lymphadenopathy:      Head:      Right side of head: No submental, submandibular, tonsillar, preauricular, posterior auricular or occipital adenopathy.      Left side  of head: No submental, submandibular, tonsillar, preauricular, posterior auricular or occipital adenopathy.   Skin:     General: Skin is warm and dry.      Nails: There is no clubbing.   Neurological:      Mental Status: She is alert.   Psychiatric:         Behavior: Behavior is cooperative.        Physical Exam  Vital Signs  Patient's weight is 244 pounds.    Result Review :  The following data was reviewed by: RICHARD Joe on 06/26/2024:  Common labs          12/12/2023    12:31 3/20/2024    09:25 6/26/2024    10:45   Common Labs   Glucose 176  183  167    BUN 15  14  17    Creatinine 0.69  0.68  0.77    Sodium 135  138  137    Potassium 4.5  4.4  4.7    Chloride 99  103  95    Calcium 9.9  9.5  10.1    Total Protein 7.1  7.4  7.6    Albumin 4.8  4.6  4.9    Total Bilirubin 0.4  0.2  0.4    Alkaline Phosphatase 54  52  55    AST (SGOT) 15  10  12    ALT (SGPT) 20  16  21    WBC  10.6  10.95    Hemoglobin  15.4  16.3    Hematocrit  45.0  48.9    Platelets  296  330    Total Cholesterol 194   200    Triglycerides 344   392    HDL Cholesterol 32   32    LDL Cholesterol  103   102    Hemoglobin A1C 8.60  8.7  8.70    Microalbumin, Urine  4.6       Data reviewed : Radiologic studies CT chest 5/16/24      Results  Laboratory Studies  A1c was 8.7.    Imaging  CT scan of the chest showed a 6 mm nodule in the right upper lobe, unchanged since 2023.             Assessment and Plan   Diagnoses and all orders for this visit:    1. Type 2 diabetes mellitus with hyperglycemia, without long-term current use of insulin (Primary)  Assessment & Plan:  Her Ozempic was recently increased to 1 mg weekly which she is tolerating well. A1c has been elevated with recent labs, goal is <7. Recheck today and consider titrating medication pending labs.    Orders:  -     Hemoglobin A1c    2. Essential hypertension  Assessment & Plan:  BP is low in the office today, currently managed on lisinopril daily. Monitor BP and call with low  readings (may need to lower lisinopril dose).    Orders:  -     CBC (No Diff)  -     Comprehensive Metabolic Panel  -     TSH    3. Mixed hyperlipidemia  Assessment & Plan:  She denies myalgias with fenofibrate and pravastatin which she will continue along with a low-fat, low-cholesterol diet.   Lab Results   Component Value Date     (H) 06/26/2024       Orders:  -     Lipid Panel      Assessment & Plan  Diabetes.  The patient's weight has decreased from 282 to 244 pounds, indicating a weight loss of 38 pounds. Continuation of Ozempic 1 mg, Farxiga, metformin, and glipizide is advised.    Health maintenance.  She will receive her mammogram through the [facility] where she has received in the past. Blood work will be ordered.         Follow Up   Return in about 3 months (around 9/26/2024).  Patient was given instructions and counseling regarding her condition or for health maintenance advice. Please see specific information pulled into the AVS if appropriate.     Patient or patient representative verbalized consent for the use of Ambient Listening during the visit with  RICHARD Joe for chart documentation. 7/7/2024  14:29 EDT

## 2024-07-07 NOTE — ASSESSMENT & PLAN NOTE
Her Ozempic was recently increased to 1 mg weekly which she is tolerating well. A1c has been elevated with recent labs, goal is <7. Recheck today and consider titrating medication pending labs.

## 2024-07-10 DIAGNOSIS — E11.65 TYPE 2 DIABETES MELLITUS WITH HYPERGLYCEMIA, WITHOUT LONG-TERM CURRENT USE OF INSULIN: Chronic | ICD-10-CM

## 2024-07-11 RX ORDER — SEMAGLUTIDE 2.68 MG/ML
2 INJECTION, SOLUTION SUBCUTANEOUS WEEKLY
Qty: 3 ML | Refills: 3 | Status: SHIPPED | OUTPATIENT
Start: 2024-07-11

## 2024-07-11 NOTE — TELEPHONE ENCOUNTER
Please notify pt I would like to increase the dose to 2 mg weekly for further glucose lowering, I have sent the prescription to the pharmacy. Please notify office with any side effects with the higher dose.

## 2024-07-17 ENCOUNTER — TELEPHONE (OUTPATIENT)
Dept: INTERNAL MEDICINE | Facility: CLINIC | Age: 54
End: 2024-07-17
Payer: COMMERCIAL

## 2024-07-17 DIAGNOSIS — E11.65 TYPE 2 DIABETES MELLITUS WITH HYPERGLYCEMIA, WITHOUT LONG-TERM CURRENT USE OF INSULIN: Primary | Chronic | ICD-10-CM

## 2024-07-17 NOTE — TELEPHONE ENCOUNTER
----- Message from Merline Tillman sent at 7/17/2024  7:26 AM EDT -----  Please notify patient her recent labs show an elevated glucose (goal is for A1c to be below 7).  Please ask her to continue to titrate Ozempic dose as we discussed.  However, I would also like for her to begin Jardiance 10 mg daily.  She may  samples as well as a discount coupon.  If she is agreeable please let me know and I will send in a prescription.  Her hemoglobin and hematocrit are also mildly elevated which we will continue to monitor.  This is most likely due to smoking and cessation is encouraged.

## 2024-07-17 NOTE — TELEPHONE ENCOUNTER
Name: Yu Sheffield    Relationship: Self    Best Callback Number: [phone]     HUB PROVIDED THE RELAY MESSAGE FROM THE OFFICE   PATIENT VOICED UNDERSTANDING AND HAS NO FURTHER QUESTIONS AT THIS TIME    ADDITIONAL INFORMATION: PATIENT IS IN AGREEMENT WITH STARTING JARDIANCE. PLEASE MESSAGE PATIENT AND ADVISE WHEN SAMPLES & DISCOUNT CARD CAN BE PICKED UP.

## 2024-07-17 NOTE — TELEPHONE ENCOUNTER
Attempted to call patient, LVM requesting call back. Will post pone for reattempt    HUB TO READ  Merline states your recent labs show an elevated glucose (goal is for A1c to be below 7).  Please continue to titrate Ozempic dose as discussed.      However, Merline would also like for you to begin Jardiance 10 mg daily. You may  samples as well as a discount coupon. Your hemoglobin and hematocrit are also mildly elevated which we will continue to monitor. This is most likely due to smoking and cessation is encouraged.     HUB  If she is agreeable to starting Jardiance please let us know

## 2024-07-17 NOTE — TELEPHONE ENCOUNTER
Attempted to call patient, LVM advising per  verbal release    HUB OK TO RELAY  Samples are at  for patient. She can  M-F 7:45-4:15. Thank you!    Merline - patient is agreeable to starting Jardiance

## 2024-07-29 ENCOUNTER — PATIENT MESSAGE (OUTPATIENT)
Dept: INTERNAL MEDICINE | Facility: CLINIC | Age: 54
End: 2024-07-29
Payer: COMMERCIAL

## 2024-07-29 DIAGNOSIS — E11.65 TYPE 2 DIABETES MELLITUS WITH HYPERGLYCEMIA, WITHOUT LONG-TERM CURRENT USE OF INSULIN: Primary | Chronic | ICD-10-CM

## 2024-07-30 NOTE — TELEPHONE ENCOUNTER
From: Yu Sheffield  To: Merline Tillman  Sent: 7/29/2024 6:42 PM EDT  Subject: new p    Hello, can you check on prescription to see if order was placed? Last note said Rx for Jardiance sent to mail order. I picked up the samples and on the second week of those, no issues so far with it.   I don't see the Jardiance on the med list on my chart, and I checked the [facility] and there is not any reference to a new script order. Thanks!

## 2024-08-15 DIAGNOSIS — E11.65 TYPE 2 DIABETES MELLITUS WITH HYPERGLYCEMIA, WITHOUT LONG-TERM CURRENT USE OF INSULIN: ICD-10-CM

## 2024-08-15 RX ORDER — LISINOPRIL 5 MG/1
5 TABLET ORAL DAILY
Qty: 90 TABLET | Refills: 1 | Status: SHIPPED | OUTPATIENT
Start: 2024-08-15

## 2024-08-15 RX ORDER — DAPAGLIFLOZIN 10 MG/1
1 TABLET, FILM COATED ORAL DAILY
Qty: 90 TABLET | Refills: 1 | OUTPATIENT
Start: 2024-08-15

## 2024-08-23 DIAGNOSIS — N32.81 OVERACTIVE BLADDER: Chronic | ICD-10-CM

## 2024-08-26 RX ORDER — SOLIFENACIN SUCCINATE 10 MG/1
10 TABLET, FILM COATED ORAL DAILY
Qty: 90 TABLET | Refills: 0 | Status: SHIPPED | OUTPATIENT
Start: 2024-08-26

## 2024-10-08 ENCOUNTER — OFFICE VISIT (OUTPATIENT)
Dept: INTERNAL MEDICINE | Facility: CLINIC | Age: 54
End: 2024-10-08
Payer: COMMERCIAL

## 2024-10-08 VITALS
DIASTOLIC BLOOD PRESSURE: 72 MMHG | OXYGEN SATURATION: 96 % | SYSTOLIC BLOOD PRESSURE: 106 MMHG | BODY MASS INDEX: 33.33 KG/M2 | WEIGHT: 232.8 LBS | HEIGHT: 70 IN | HEART RATE: 77 BPM | TEMPERATURE: 97.8 F

## 2024-10-08 DIAGNOSIS — G43.019 INTRACTABLE MIGRAINE WITHOUT AURA AND WITHOUT STATUS MIGRAINOSUS: ICD-10-CM

## 2024-10-08 DIAGNOSIS — Z23 NEED FOR INFLUENZA VACCINATION: ICD-10-CM

## 2024-10-08 DIAGNOSIS — Z23 NEED FOR COVID-19 VACCINE: ICD-10-CM

## 2024-10-08 DIAGNOSIS — E11.65 TYPE 2 DIABETES MELLITUS WITH HYPERGLYCEMIA, WITHOUT LONG-TERM CURRENT USE OF INSULIN: Primary | Chronic | ICD-10-CM

## 2024-10-08 DIAGNOSIS — I10 ESSENTIAL HYPERTENSION: Chronic | ICD-10-CM

## 2024-10-08 LAB
ALBUMIN SERPL-MCNC: 4.2 G/DL (ref 3.5–5.2)
ALBUMIN/GLOB SERPL: 1.7 G/DL
ALP SERPL-CCNC: 41 U/L (ref 39–117)
ALT SERPL-CCNC: 15 U/L (ref 1–33)
AST SERPL-CCNC: 12 U/L (ref 1–32)
BILIRUB SERPL-MCNC: 0.3 MG/DL (ref 0–1.2)
BUN SERPL-MCNC: 14 MG/DL (ref 6–20)
BUN/CREAT SERPL: 22.6 (ref 7–25)
CALCIUM SERPL-MCNC: 9 MG/DL (ref 8.6–10.5)
CHLORIDE SERPL-SCNC: 103 MMOL/L (ref 98–107)
CO2 SERPL-SCNC: 23.9 MMOL/L (ref 22–29)
CREAT SERPL-MCNC: 0.62 MG/DL (ref 0.57–1)
EGFRCR SERPLBLD CKD-EPI 2021: 105.3 ML/MIN/1.73
GLOBULIN SER CALC-MCNC: 2.5 GM/DL
GLUCOSE SERPL-MCNC: 122 MG/DL (ref 65–99)
HBA1C MFR BLD: 6.8 % (ref 4.8–5.6)
POTASSIUM SERPL-SCNC: 4.5 MMOL/L (ref 3.5–5.2)
PROT SERPL-MCNC: 6.7 G/DL (ref 6–8.5)
SODIUM SERPL-SCNC: 139 MMOL/L (ref 136–145)

## 2024-10-08 PROCEDURE — 99214 OFFICE O/P EST MOD 30 MIN: CPT | Performed by: NURSE PRACTITIONER

## 2024-10-08 PROCEDURE — 90656 IIV3 VACC NO PRSV 0.5 ML IM: CPT | Performed by: NURSE PRACTITIONER

## 2024-10-08 PROCEDURE — 90480 ADMN SARSCOV2 VAC 1/ONLY CMP: CPT | Performed by: NURSE PRACTITIONER

## 2024-10-08 PROCEDURE — 91320 SARSCV2 VAC 30MCG TRS-SUC IM: CPT | Performed by: NURSE PRACTITIONER

## 2024-10-08 PROCEDURE — 90471 IMMUNIZATION ADMIN: CPT | Performed by: NURSE PRACTITIONER

## 2024-10-08 RX ORDER — LISINOPRIL 2.5 MG/1
2.5 TABLET ORAL DAILY
Qty: 90 TABLET | Refills: 1 | Status: SHIPPED | OUTPATIENT
Start: 2024-10-08

## 2024-10-22 PROBLEM — G43.019 INTRACTABLE MIGRAINE WITHOUT AURA AND WITHOUT STATUS MIGRAINOSUS: Status: ACTIVE | Noted: 2024-10-22

## 2024-10-22 PROBLEM — G43.019 INTRACTABLE MIGRAINE WITHOUT AURA AND WITHOUT STATUS MIGRAINOSUS: Chronic | Status: ACTIVE | Noted: 2024-10-22

## 2024-10-22 RX ORDER — PRAVASTATIN SODIUM 40 MG
40 TABLET ORAL DAILY
Qty: 90 TABLET | Refills: 1 | Status: SHIPPED | OUTPATIENT
Start: 2024-10-22

## 2024-10-22 NOTE — ASSESSMENT & PLAN NOTE
Her A1c level was recorded at 8.7 during the last visit, indicating a need for better glycemic control. Blood work will be conducted today to assess her current A1c level. If it exceeds 7, consideration will be given to initiating Actos or insulin therapy. The dosage of Jardiance may be increased from 10 mg to 25 mg if needed. Dietary modifications were also recommended, specifically watching carbohydrate and sugar intake.

## 2024-10-22 NOTE — PROGRESS NOTES
Chief Complaint  Diabetes (3 month follow up)  Esdras Sheffield presents to [facility]  Diabetes  She has type 2 diabetes mellitus. No MedicAlert identification noted. Pertinent negatives for hypoglycemia include no confusion, headaches, speech difficulty, sweats or tremors. Associated symptoms include polydipsia and polyuria. Pertinent negatives for diabetes include no blurred vision, no foot paresthesias, no foot ulcerations and no weight loss. Pertinent negatives for hypoglycemia complications include no blackouts, no hospitalization, no nocturnal hypoglycemia, no required assistance and no required glucagon injection. Symptoms are stable. She is compliant with treatment most of the time. She is following a generally unhealthy diet. When asked about meal planning, she reported none. She participates in exercise intermittently. She monitors blood glucose at home 1-2 x per day. Her highest blood glucose is 140-180 mg/dl. She does not see a podiatrist. Eye exam is current.     History of Present Illness  The patient presents for evaluation of multiple medical concerns.    She reports fluctuating blood sugar levels without hypoglycemic episodes. She is currently on Ozempic 2 mg, which she believes is helping her appetite. Even though she monitors her carbohydrate and sugar intake, she admits that her diet could be improved. She does not experience heartburn or indigestion and reports regular bowel movements without constipation since starting Ozempic.    She occasionally experiences dizziness and lightheadedness.    She recently experienced a severe migraine, which was unusual as she had not had one in a long time. The migraine lasted all day and was not alleviated by Excedrin Migraine which usually helps. Sx have not returned.    She has an appointment scheduled for a mammogram in two weeks. She reports no allergies in the fall.    Objective   Vital Signs:  /72 (BP  "Location: Left arm, Patient Position: Sitting, Cuff Size: Large Adult)   Pulse 77   Temp 97.8 °F (36.6 °C)   Ht 177.8 cm (70\")   Wt 106 kg (232 lb 12.8 oz)   SpO2 96%   BMI 33.40 kg/m²   Estimated body mass index is 33.4 kg/m² as calculated from the following:    Height as of this encounter: 177.8 cm (70\").    Weight as of this encounter: 106 kg (232 lb 12.8 oz).            Physical Exam  Constitutional:       Appearance: She is well-developed. She is not ill-appearing.   HENT:      Head: Normocephalic.      Right Ear: Hearing, tympanic membrane and external ear normal.      Left Ear: Hearing, tympanic membrane and external ear normal.      Nose: Nose normal. No nasal deformity, mucosal edema or rhinorrhea.      Right Sinus: No maxillary sinus tenderness or frontal sinus tenderness.      Left Sinus: No maxillary sinus tenderness or frontal sinus tenderness.      Mouth/Throat:      Dentition: Normal dentition.   Eyes:      General: Lids are normal.         Right eye: No discharge.         Left eye: No discharge.      Conjunctiva/sclera: Conjunctivae normal.      Right eye: No exudate.     Left eye: No exudate.  Neck:      Thyroid: No thyroid mass or thyromegaly.      Vascular: No carotid bruit.      Trachea: Trachea normal.   Cardiovascular:      Rate and Rhythm: Regular rhythm.      Pulses: Normal pulses.      Heart sounds: Normal heart sounds. No murmur heard.  Pulmonary:      Effort: No respiratory distress.      Breath sounds: Normal breath sounds. No decreased breath sounds, wheezing, rhonchi or rales.   Abdominal:      General: Bowel sounds are normal.      Palpations: Abdomen is soft.      Tenderness: There is no abdominal tenderness.   Musculoskeletal:      Cervical back: Normal range of motion. No edema.   Lymphadenopathy:      Head:      Right side of head: No submental, submandibular, tonsillar, preauricular, posterior auricular or occipital adenopathy.      Left side of head: No submental, " submandibular, tonsillar, preauricular, posterior auricular or occipital adenopathy.   Skin:     General: Skin is warm and dry.      Nails: There is no clubbing.   Neurological:      Mental Status: She is alert.   Psychiatric:         Behavior: Behavior is cooperative.        Physical Exam      Result Review :  The following data was reviewed by: RICHARD Joe on 10/08/2024:  Common labs          3/20/2024    09:25 6/26/2024    10:45 10/8/2024    09:52   Common Labs   Glucose 183  167  122    BUN 14  17  14    Creatinine 0.68  0.77  0.62    Sodium 138  137  139    Potassium 4.4  4.7  4.5    Chloride 103  95  103    Calcium 9.5  10.1  9.0    Total Protein 7.4  7.6  6.7    Albumin 4.6  4.9  4.2    Total Bilirubin 0.2  0.4  0.3    Alkaline Phosphatase 52  55  41    AST (SGOT) 10  12  12    ALT (SGPT) 16  21  15    WBC 10.6  10.95     Hemoglobin 15.4  16.3     Hematocrit 45.0  48.9     Platelets 296  330     Total Cholesterol  200     Triglycerides  392     HDL Cholesterol  32     LDL Cholesterol   102     Hemoglobin A1C 8.7  8.70  6.80    Microalbumin, Urine 4.6             Results  Laboratory Studies  A1c was 8.7.             Assessment and Plan   Diagnoses and all orders for this visit:    1. Type 2 diabetes mellitus with hyperglycemia, without long-term current use of insulin (Primary)  Assessment & Plan:  Her A1c level was recorded at 8.7 during the last visit, indicating a need for better glycemic control. Blood work will be conducted today to assess her current A1c level. If it exceeds 7, consideration will be given to initiating Actos or insulin therapy. The dosage of Jardiance may be increased from 10 mg to 25 mg if needed. Dietary modifications were also recommended, specifically watching carbohydrate and sugar intake.    Orders:  -     lisinopril (PRINIVIL,ZESTRIL) 2.5 MG tablet; Take 1 tablet by mouth Daily.  Dispense: 90 tablet; Refill: 1  -     Hemoglobin A1c  -     Comprehensive Metabolic  Panel    2. Essential hypertension  Assessment & Plan:  Her blood pressure readings have consistently been on the lower side. The dosage of lisinopril will be reduced to 2.5 mg. Should her blood pressure remain low despite the reduced dosage, discontinuation of lisinopril will be considered (will try to remain on med if possible for renal protection). She was advised to monitor for symptoms of dizziness and lightheadedness.      3. Intractable migraine without aura and without status migrainosus  Assessment & Plan:  She experienced a major migraine last week, which lasted all day despite taking Excedrin Migraine. She was advised to continue using Excedrin Migraine for future episodes and to monitor for any potential triggers.      4. Need for COVID-19 vaccine  -     COVID-19 (Pfizer) 12yrs+ (COMIRNATY)    5. Need for influenza vaccination  -     Fluzone >6mos      Assessment & Plan  Health Maintenance.  She will receive her influenza and COVID-19 vaccines today. Additionally, her kidney and liver function will be evaluated through blood tests. She has an upcoming mammogram appointment in two weeks and was reminded to ask them to send a copy of the results.             Follow Up   Return in about 3 months (around 1/8/2025).  Patient was given instructions and counseling regarding her condition or for health maintenance advice. Please see specific information pulled into the AVS if appropriate.     Patient or patient representative verbalized consent for the use of Ambient Listening during the visit with  RICHARD Joe for chart documentation. 10/22/2024  19:18 EDT  Answers submitted by the patient for this visit:  Primary Reason for Visit (Submitted on 10/7/2024)  What is the primary reason for your visit?: Diabetes  Diabetes Questionnaire (Submitted on 10/7/2024)  Chief Complaint: Diabetes problem  Below 70: never

## 2024-10-22 NOTE — ASSESSMENT & PLAN NOTE
She experienced a major migraine last week, which lasted all day despite taking Excedrin Migraine. She was advised to continue using Excedrin Migraine for future episodes and to monitor for any potential triggers.

## 2024-10-22 NOTE — ASSESSMENT & PLAN NOTE
Her blood pressure readings have consistently been on the lower side. The dosage of lisinopril will be reduced to 2.5 mg. Should her blood pressure remain low despite the reduced dosage, discontinuation of lisinopril will be considered (will try to remain on med if possible for renal protection). She was advised to monitor for symptoms of dizziness and lightheadedness.

## 2024-10-29 DIAGNOSIS — E11.65 TYPE 2 DIABETES MELLITUS WITH HYPERGLYCEMIA, WITHOUT LONG-TERM CURRENT USE OF INSULIN: Chronic | ICD-10-CM

## 2024-10-29 RX ORDER — SEMAGLUTIDE 2.68 MG/ML
2 INJECTION, SOLUTION SUBCUTANEOUS WEEKLY
Qty: 9 ML | Refills: 1 | Status: SHIPPED | OUTPATIENT
Start: 2024-10-29

## 2024-11-16 DIAGNOSIS — N32.81 OVERACTIVE BLADDER: Chronic | ICD-10-CM

## 2024-11-18 RX ORDER — ICOSAPENT ETHYL 1 G/1
CAPSULE ORAL
Qty: 360 CAPSULE | Refills: 1 | Status: SHIPPED | OUTPATIENT
Start: 2024-11-18

## 2024-11-18 RX ORDER — SOLIFENACIN SUCCINATE 10 MG/1
10 TABLET, FILM COATED ORAL DAILY
Qty: 90 TABLET | Refills: 1 | Status: SHIPPED | OUTPATIENT
Start: 2024-11-18

## 2024-11-18 NOTE — TELEPHONE ENCOUNTER
Rx Refill Note  Requested Prescriptions     Pending Prescriptions Disp Refills    solifenacin (VESICARE) 10 MG tablet [Pharmacy Med Name: SOLIFENACIN  TAB 10MG] 90 tablet 0     Sig: TAKE 1 TABLET DAILY    icosapent ethyl (VASCEPA) 1 g capsule capsule [Pharmacy Med Name: ICOSAPENT CAP 1GM] 360 capsule 0     Sig: TAKE 2 CAPSULES TWO TIMES ADAY WITH MEALS.      Last office visit with prescribing clinician: 10/8/2024   Last telemedicine visit with prescribing clinician: Visit date not found   Next office visit with prescribing clinician: 1/13/2025

## 2024-12-08 DIAGNOSIS — E11.65 TYPE 2 DIABETES MELLITUS WITH HYPERGLYCEMIA, WITHOUT LONG-TERM CURRENT USE OF INSULIN: ICD-10-CM

## 2024-12-08 DIAGNOSIS — E78.2 MIXED HYPERLIPIDEMIA: ICD-10-CM

## 2024-12-09 RX ORDER — EMPAGLIFLOZIN 10 MG/1
10 TABLET, FILM COATED ORAL DAILY
Qty: 90 TABLET | Refills: 1 | Status: SHIPPED | OUTPATIENT
Start: 2024-12-09

## 2024-12-09 RX ORDER — FENOFIBRATE 145 MG/1
145 TABLET, COATED ORAL
Qty: 90 TABLET | Refills: 1 | Status: SHIPPED | OUTPATIENT
Start: 2024-12-09

## 2025-01-13 ENCOUNTER — OFFICE VISIT (OUTPATIENT)
Dept: INTERNAL MEDICINE | Facility: CLINIC | Age: 55
End: 2025-01-13
Payer: COMMERCIAL

## 2025-01-13 VITALS
RESPIRATION RATE: 18 BRPM | HEIGHT: 70 IN | OXYGEN SATURATION: 97 % | SYSTOLIC BLOOD PRESSURE: 120 MMHG | BODY MASS INDEX: 33.79 KG/M2 | TEMPERATURE: 97.9 F | DIASTOLIC BLOOD PRESSURE: 78 MMHG | HEART RATE: 79 BPM | WEIGHT: 236 LBS

## 2025-01-13 DIAGNOSIS — E78.2 MIXED HYPERLIPIDEMIA: Chronic | ICD-10-CM

## 2025-01-13 DIAGNOSIS — I10 ESSENTIAL HYPERTENSION: Chronic | ICD-10-CM

## 2025-01-13 DIAGNOSIS — E11.65 TYPE 2 DIABETES MELLITUS WITH HYPERGLYCEMIA, WITHOUT LONG-TERM CURRENT USE OF INSULIN: Primary | Chronic | ICD-10-CM

## 2025-01-13 DIAGNOSIS — Z12.39 SCREENING BREAST EXAMINATION: ICD-10-CM

## 2025-01-13 LAB
ALBUMIN SERPL-MCNC: 4.5 G/DL (ref 3.5–5.2)
ALBUMIN/GLOB SERPL: 1.7 G/DL
ALP SERPL-CCNC: 41 U/L (ref 39–117)
ALT SERPL-CCNC: 16 U/L (ref 1–33)
AST SERPL-CCNC: 10 U/L (ref 1–32)
BILIRUB SERPL-MCNC: 0.3 MG/DL (ref 0–1.2)
BUN SERPL-MCNC: 13 MG/DL (ref 6–20)
BUN/CREAT SERPL: 22.4 (ref 7–25)
CALCIUM SERPL-MCNC: 9.6 MG/DL (ref 8.6–10.5)
CHLORIDE SERPL-SCNC: 103 MMOL/L (ref 98–107)
CHOLEST SERPL-MCNC: 185 MG/DL (ref 0–200)
CO2 SERPL-SCNC: 25.4 MMOL/L (ref 22–29)
CREAT SERPL-MCNC: 0.58 MG/DL (ref 0.57–1)
EGFRCR SERPLBLD CKD-EPI 2021: 107 ML/MIN/1.73
ERYTHROCYTE [DISTWIDTH] IN BLOOD BY AUTOMATED COUNT: 13 % (ref 12.3–15.4)
GLOBULIN SER CALC-MCNC: 2.7 GM/DL
GLUCOSE SERPL-MCNC: 149 MG/DL (ref 65–99)
HBA1C MFR BLD: 7.3 % (ref 4.8–5.6)
HCT VFR BLD AUTO: 44.6 % (ref 34–46.6)
HDLC SERPL-MCNC: 39 MG/DL (ref 40–60)
HGB BLD-MCNC: 14.5 G/DL (ref 12–15.9)
LDLC SERPL CALC-MCNC: 125 MG/DL (ref 0–100)
MCH RBC QN AUTO: 31 PG (ref 26.6–33)
MCHC RBC AUTO-ENTMCNC: 32.5 G/DL (ref 31.5–35.7)
MCV RBC AUTO: 95.3 FL (ref 79–97)
PLATELET # BLD AUTO: 281 10*3/MM3 (ref 140–450)
POTASSIUM SERPL-SCNC: 4.4 MMOL/L (ref 3.5–5.2)
PROT SERPL-MCNC: 7.2 G/DL (ref 6–8.5)
RBC # BLD AUTO: 4.68 10*6/MM3 (ref 3.77–5.28)
SODIUM SERPL-SCNC: 139 MMOL/L (ref 136–145)
TRIGL SERPL-MCNC: 113 MG/DL (ref 0–150)
VLDLC SERPL CALC-MCNC: 21 MG/DL (ref 5–40)
WBC # BLD AUTO: 9.19 10*3/MM3 (ref 3.4–10.8)

## 2025-01-13 PROCEDURE — 99214 OFFICE O/P EST MOD 30 MIN: CPT | Performed by: NURSE PRACTITIONER

## 2025-01-13 NOTE — ASSESSMENT & PLAN NOTE
Her triglycerides remained elevated at 392 with June 2024 labs.  She is currently managed on Vascepa, fenofibrate and pravastatin.  Her glucose has improved significantly since then which will hopefully help lower her triglycerides, recheck lipid panel.

## 2025-01-13 NOTE — ASSESSMENT & PLAN NOTE
Her blood pressure is excellent in the office, continue lisinopril along with a low-fat sodium diet.  She was experiencing lower blood pressure readings with higher doses of lisinopril which have improved with lower dose.

## 2025-01-13 NOTE — ASSESSMENT & PLAN NOTE
Her A1c level, recorded on 10/08/2024, was 6.8, which is within the desired range. This is a significant improvement from previous readings of 7 in September 2021 and 8.7 in June 2021. She is currently on Jardiance, metformin, and Ozempic. No issues with upset stomach or constipation from Ozempic were reported. Blood work will be conducted today to monitor her A1c levels.

## 2025-01-13 NOTE — PROGRESS NOTES
"Chief Complaint  Diabetes, Hyperlipidemia, and Hypertension  Subjective        Yu Sheffield presents to [facility]  History of Present Illness  History of Present Illness  The patient presents for evaluation of diabetes mellitus, hypertriglyceridemia, and health maintenance.    Her recent labs show an improvement in her A1c from 8.7-6.8.  She continues on Ozempic 2 mg weekly, Jardiance 10 mg and metformin.  She is tolerating medications well without GI side effects.  She does admit to not watching her diet as closely during the holidays, unsure what glucose readings have been.  She reports feeling well overall without chest pain or shortness of breath.    She wears compression socks daily, sits at desk during work hours.  Denies edema of lower extremities.    She was scheduled for a mammogram in October 2024 (receives on mammogram bus), but it was canceled and she has not been able to reschedule it. She requests an order for a mammogram to be done at [facility] [address].     She had an eye exam on 12/17/2023 and reports no changes in her vision.    Objective   Vital Signs:  /78 (BP Location: Left arm)   Pulse 79   Temp 97.9 °F (36.6 °C) (Oral)   Resp 18   Ht 177.8 cm (70\")   Wt 107 kg (236 lb)   SpO2 97%   BMI 33.86 kg/m²   Estimated body mass index is 33.86 kg/m² as calculated from the following:    Height as of this encounter: 177.8 cm (70\").    Weight as of this encounter: 107 kg (236 lb).            Physical Exam  Constitutional:       Appearance: She is well-developed. She is not ill-appearing.   HENT:      Head: Normocephalic.      Right Ear: Hearing, tympanic membrane and external ear normal.      Left Ear: Hearing, tympanic membrane and external ear normal.      Nose: Nose normal. No nasal deformity, mucosal edema or rhinorrhea.      Right Sinus: No maxillary sinus tenderness or frontal sinus tenderness.      Left Sinus: No maxillary sinus tenderness " or frontal sinus tenderness.      Mouth/Throat:      Dentition: Normal dentition.   Eyes:      General: Lids are normal.         Right eye: No discharge.         Left eye: No discharge.      Conjunctiva/sclera: Conjunctivae normal.      Right eye: No exudate.     Left eye: No exudate.  Neck:      Thyroid: No thyroid mass or thyromegaly.      Vascular: No carotid bruit.      Trachea: Trachea normal.   Cardiovascular:      Rate and Rhythm: Regular rhythm.      Pulses: Normal pulses.      Heart sounds: Normal heart sounds. No murmur heard.  Pulmonary:      Effort: No respiratory distress.      Breath sounds: Normal breath sounds. No decreased breath sounds, wheezing, rhonchi or rales.   Abdominal:      General: Bowel sounds are normal.      Palpations: Abdomen is soft.      Tenderness: There is no abdominal tenderness.   Musculoskeletal:      Cervical back: Normal range of motion. No edema.   Lymphadenopathy:      Head:      Right side of head: No submental, submandibular, tonsillar, preauricular, posterior auricular or occipital adenopathy.      Left side of head: No submental, submandibular, tonsillar, preauricular, posterior auricular or occipital adenopathy.   Skin:     General: Skin is warm and dry.      Nails: There is no clubbing.   Neurological:      Mental Status: She is alert.   Psychiatric:         Behavior: Behavior is cooperative.        Physical Exam  Oral exam was performed.  Lungs were auscultated.    Result Review :  The following data was reviewed by: RICHARD Joe on 01/13/2025:  Common labs          3/20/2024    09:25 6/26/2024    10:45 10/8/2024    09:52   Common Labs   Glucose 183  167  122    BUN 14  17  14    Creatinine 0.68  0.77  0.62    Sodium 138  137  139    Potassium 4.4  4.7  4.5    Chloride 103  95  103    Calcium 9.5  10.1  9.0    Total Protein 7.4  7.6  6.7    Albumin 4.6  4.9  4.2    Total Bilirubin 0.2  0.4  0.3    Alkaline Phosphatase 52  55  41    AST (SGOT) 10  12  12     ALT (SGPT) 16  21  15    WBC 10.6  10.95     Hemoglobin 15.4  16.3     Hematocrit 45.0  48.9     Platelets 296  330     Total Cholesterol  200     Triglycerides  392     HDL Cholesterol  32     LDL Cholesterol   102     Hemoglobin A1C 8.7  8.70  6.80    Microalbumin, Urine 4.6             Results  Laboratory Studies  A1c was 6.8 on 10/08/2024.             Assessment and Plan   Diagnoses and all orders for this visit:    1. Type 2 diabetes mellitus with hyperglycemia, without long-term current use of insulin (Primary)  Assessment & Plan:  Her A1c level, recorded on 10/08/2024, was 6.8, which is within the desired range. This is a significant improvement from previous readings of 7 in September 2021 and 8.7 in June 2021. She is currently on Jardiance, metformin, and Ozempic. No issues with upset stomach or constipation from Ozempic were reported. Blood work will be conducted today to monitor her A1c levels.    Orders:  -     Hemoglobin A1c    2. Essential hypertension  Assessment & Plan:  Her blood pressure is excellent in the office, continue lisinopril along with a low-fat sodium diet.  She was experiencing lower blood pressure readings with higher doses of lisinopril which have improved with lower dose.    Orders:  -     CBC (No Diff)  -     Comprehensive Metabolic Panel    3. Mixed hyperlipidemia  Assessment & Plan:  Her triglycerides remained elevated at 392 with June 2024 labs.  She is currently managed on Vascepa, fenofibrate and pravastatin.  Her glucose has improved significantly since then which will hopefully help lower her triglycerides, recheck lipid panel.    Orders:  -     Lipid Panel    4. Screening breast examination  -     Mammo Screening Digital Tomosynthesis Bilateral With CAD; Future      Assessment & Plan           Follow Up   Return in about 3 months (around 4/13/2025).  Patient was given instructions and counseling regarding her condition or for health maintenance advice. Please see specific  information pulled into the AVS if appropriate.     Patient or patient representative verbalized consent for the use of Ambient Listening during the visit with  RICHARD Joe for chart documentation. 1/13/2025  09:16 EST

## 2025-01-15 ENCOUNTER — TELEPHONE (OUTPATIENT)
Dept: INTERNAL MEDICINE | Facility: CLINIC | Age: 55
End: 2025-01-15
Payer: COMMERCIAL

## 2025-01-15 NOTE — TELEPHONE ENCOUNTER
Attempted to call patient, LVM requesting call back. Will post pone for reattempt    HUB TO READ  [facility] states your recent labs showed an elevated A1c of 7.3 (goal is under 7). Please increase Jardiance from 10 mg to 25 mg for further glucose lowering. Your cholesterol has also increased (LDL has increased from 102 to 125) - are you taking Pravastatin daily?

## 2025-01-17 NOTE — TELEPHONE ENCOUNTER
Patient advised. She states she is taking Pravastatin daily, and will need a Rx for Jardiance 25mg sent to [facility] for a 90 day supply

## 2025-01-19 DIAGNOSIS — E11.65 TYPE 2 DIABETES MELLITUS WITH HYPERGLYCEMIA, WITHOUT LONG-TERM CURRENT USE OF INSULIN: Primary | Chronic | ICD-10-CM

## 2025-01-19 NOTE — TELEPHONE ENCOUNTER
Recheck lipid panel at next office visit, Rx for Jardiance 25 mg sent to mail order. Recheck A1c at f/u appt.

## 2025-03-01 DIAGNOSIS — E11.65 TYPE 2 DIABETES MELLITUS WITH HYPERGLYCEMIA, WITHOUT LONG-TERM CURRENT USE OF INSULIN: Chronic | ICD-10-CM

## 2025-03-03 RX ORDER — LISINOPRIL 2.5 MG/1
2.5 TABLET ORAL DAILY
Qty: 90 TABLET | Refills: 1 | Status: SHIPPED | OUTPATIENT
Start: 2025-03-03

## 2025-03-24 DIAGNOSIS — E11.65 TYPE 2 DIABETES MELLITUS WITH HYPERGLYCEMIA, WITHOUT LONG-TERM CURRENT USE OF INSULIN: Chronic | ICD-10-CM

## 2025-03-25 RX ORDER — SEMAGLUTIDE 2.68 MG/ML
INJECTION, SOLUTION SUBCUTANEOUS
Qty: 9 ML | Refills: 1 | Status: SHIPPED | OUTPATIENT
Start: 2025-03-25

## 2025-04-23 ENCOUNTER — OFFICE VISIT (OUTPATIENT)
Dept: INTERNAL MEDICINE | Facility: CLINIC | Age: 55
End: 2025-04-23
Payer: COMMERCIAL

## 2025-04-23 VITALS
HEIGHT: 70 IN | DIASTOLIC BLOOD PRESSURE: 82 MMHG | SYSTOLIC BLOOD PRESSURE: 124 MMHG | BODY MASS INDEX: 33.27 KG/M2 | TEMPERATURE: 97.8 F | HEART RATE: 85 BPM | OXYGEN SATURATION: 97 % | WEIGHT: 232.4 LBS

## 2025-04-23 DIAGNOSIS — E11.65 TYPE 2 DIABETES MELLITUS WITH HYPERGLYCEMIA, WITHOUT LONG-TERM CURRENT USE OF INSULIN: Chronic | ICD-10-CM

## 2025-04-23 DIAGNOSIS — Z00.00 PHYSICAL EXAM: ICD-10-CM

## 2025-04-23 DIAGNOSIS — Z12.2 SCREENING FOR LUNG CANCER: ICD-10-CM

## 2025-04-23 DIAGNOSIS — E78.2 MIXED HYPERLIPIDEMIA: Chronic | ICD-10-CM

## 2025-04-23 DIAGNOSIS — Z72.0 TOBACCO USE: Chronic | ICD-10-CM

## 2025-04-23 DIAGNOSIS — N32.81 OVERACTIVE BLADDER: Primary | Chronic | ICD-10-CM

## 2025-04-23 RX ORDER — VIBEGRON 75 MG/1
1 TABLET, FILM COATED ORAL DAILY
Start: 2025-04-23 | End: 2025-04-30 | Stop reason: SDUPTHER

## 2025-04-23 RX ORDER — PRAVASTATIN SODIUM 40 MG
40 TABLET ORAL DAILY
Qty: 90 TABLET | Refills: 1 | Status: SHIPPED | OUTPATIENT
Start: 2025-04-23

## 2025-04-24 LAB
ALBUMIN SERPL-MCNC: 4.5 G/DL (ref 3.8–4.9)
ALBUMIN/CREAT UR: <23 MG/G CREAT (ref 0–29)
ALP SERPL-CCNC: 39 IU/L (ref 44–121)
ALT SERPL-CCNC: 16 IU/L (ref 0–32)
AST SERPL-CCNC: 12 IU/L (ref 0–40)
BILIRUB SERPL-MCNC: 0.4 MG/DL (ref 0–1.2)
BUN SERPL-MCNC: 15 MG/DL (ref 6–24)
BUN/CREAT SERPL: 23 (ref 9–23)
CALCIUM SERPL-MCNC: 10 MG/DL (ref 8.7–10.2)
CHLORIDE SERPL-SCNC: 102 MMOL/L (ref 96–106)
CHOLEST SERPL-MCNC: 158 MG/DL (ref 100–199)
CO2 SERPL-SCNC: 19 MMOL/L (ref 20–29)
CREAT SERPL-MCNC: 0.64 MG/DL (ref 0.57–1)
CREAT UR-MCNC: 13 MG/DL
EGFRCR SERPLBLD CKD-EPI 2021: 104 ML/MIN/1.73
ERYTHROCYTE [DISTWIDTH] IN BLOOD BY AUTOMATED COUNT: 13.2 % (ref 11.7–15.4)
GLOBULIN SER CALC-MCNC: 2.4 G/DL (ref 1.5–4.5)
GLUCOSE SERPL-MCNC: 110 MG/DL (ref 70–99)
HBA1C MFR BLD: 7.2 % (ref 4.8–5.6)
HCT VFR BLD AUTO: 46.4 % (ref 34–46.6)
HDLC SERPL-MCNC: 37 MG/DL
HGB BLD-MCNC: 15.4 G/DL (ref 11.1–15.9)
LDLC SERPL CALC-MCNC: 99 MG/DL (ref 0–99)
MCH RBC QN AUTO: 31.8 PG (ref 26.6–33)
MCHC RBC AUTO-ENTMCNC: 33.2 G/DL (ref 31.5–35.7)
MCV RBC AUTO: 96 FL (ref 79–97)
MICROALBUMIN UR-MCNC: <3 UG/ML
PLATELET # BLD AUTO: 309 X10E3/UL (ref 150–450)
POTASSIUM SERPL-SCNC: 4.5 MMOL/L (ref 3.5–5.2)
PROT SERPL-MCNC: 6.9 G/DL (ref 6–8.5)
RBC # BLD AUTO: 4.85 X10E6/UL (ref 3.77–5.28)
SODIUM SERPL-SCNC: 138 MMOL/L (ref 134–144)
TRIGL SERPL-MCNC: 120 MG/DL (ref 0–149)
TSH SERPL DL<=0.005 MIU/L-ACNC: 1.83 UIU/ML (ref 0.45–4.5)
VLDLC SERPL CALC-MCNC: 22 MG/DL (ref 5–40)
WBC # BLD AUTO: 9.5 X10E3/UL (ref 3.4–10.8)

## 2025-04-29 ENCOUNTER — PATIENT MESSAGE (OUTPATIENT)
Dept: INTERNAL MEDICINE | Facility: CLINIC | Age: 55
End: 2025-04-29
Payer: COMMERCIAL

## 2025-04-29 DIAGNOSIS — N32.81 OVERACTIVE BLADDER: Chronic | ICD-10-CM

## 2025-04-30 RX ORDER — VIBEGRON 75 MG/1
1 TABLET, FILM COATED ORAL DAILY
Qty: 90 TABLET | Refills: 1 | Status: SHIPPED | OUTPATIENT
Start: 2025-04-30

## 2025-05-06 NOTE — ASSESSMENT & PLAN NOTE
- LDL levels have exhibited a slight elevation, despite adherence to pravastatin regimen.  - Re-evaluation of cholesterol levels will be conducted today.  - If LDL levels persist above 100, a modification in cholesterol medication may be contemplated.

## 2025-05-06 NOTE — PROGRESS NOTES
Esdras Sheffield is a 54 y.o. female who is here for a physical exam.    History of Present Illness   History of Present Illness  The patient presents for evaluation of diabetes mellitus, hyperlipidemia, urinary frequency, and migraine headaches.    The increased dose of Jardiance (25 mg) has been well tolerated with no reported side effects. Morning blood glucose levels typically register around 140. Blood work from 01/13/2025 showed a glucose level of 149, with kidney function, liver enzymes, sodium, potassium, and blood count all normal. Cholesterol was 185, and LDL was 125.     Sleep patterns are normal, though frequent urination at night is reported despite use of Vesicare daily. Dry mouth is attributed to diabetes and CPAP machine use.    Pravastatin is currently being taken for cholesterol management, with no previous prescriptions for Lipitor or Zocor. Consideration for a change in cholesterol medication is noted due to LDL levels.    She has a previous hx of migraine HAs which have improved over the past few years, notes intermittent sinus pressure/headaches.      Past Medical History:   Diagnosis Date    Diabetes mellitus     Essential hypertension 08/28/2018    Headache     History of medical problems     Sleep Apnea; Bursitis (hip); torn meniscus    Hyperlipidemia     OAB (overactive bladder)     Obesity     Osteoarthritis of knee          Current Outpatient Medications:     empagliflozin (Jardiance) 25 MG tablet tablet, Take 1 tablet by mouth Daily., Disp: 90 tablet, Rfl: 1    fenofibrate (TRICOR) 145 MG tablet, TAKE 1 TABLET DAILY WITH   DINNER, Disp: 90 tablet, Rfl: 1    icosapent ethyl (VASCEPA) 1 g capsule capsule, TAKE 2 CAPSULES TWO TIMES ADAY WITH MEALS., Disp: 360 capsule, Rfl: 1    lisinopril (PRINIVIL,ZESTRIL) 2.5 MG tablet, TAKE 1 TABLET DAILY, Disp: 90 tablet, Rfl: 1    metFORMIN (GLUCOPHAGE) 500 MG tablet, TAKE 2 TABLETS TWICE A DAY, Disp: 360 tablet, Rfl: 1    Ozempic, 2 MG/DOSE, 8  MG/3ML solution pen-injector, INJECT 2MG SUBCUTANEOUSLY  INTO THE APPROPRIATE AREA  ONCE WEEKLY AS DIRECTED, Disp: 9 mL, Rfl: 1    pravastatin (PRAVACHOL) 40 MG tablet, TAKE 1 TABLET DAILY, Disp: 90 tablet, Rfl: 1    Vibegron (Gemtesa) 75 MG tablet, Take 1 tablet by mouth Daily., Disp: 90 tablet, Rfl: 1    No Known Allergies    Review of Systems   Constitutional:  Negative for activity change, appetite change, chills, diaphoresis, fatigue, fever and unexpected weight change.   HENT:  Negative for congestion, dental problem, drooling, ear discharge, ear pain, facial swelling, hearing loss, mouth sores, nosebleeds, postnasal drip, rhinorrhea, sinus pressure, sore throat, tinnitus and trouble swallowing.    Eyes:  Negative for photophobia, pain, discharge, redness, itching and visual disturbance.   Respiratory:  Negative for apnea, cough, choking, chest tightness, shortness of breath and wheezing.    Cardiovascular:  Negative for chest pain, palpitations and leg swelling.        No orthopnea, PND, TOMLIN   Gastrointestinal:  Negative for abdominal pain, blood in stool, constipation, diarrhea, nausea and vomiting.   Endocrine: Negative for cold intolerance, heat intolerance, polydipsia and polyuria.   Genitourinary:  Positive for frequency and urgency. Negative for decreased urine volume, dysuria, enuresis, flank pain and hematuria.   Musculoskeletal:  Negative for arthralgias, back pain, gait problem, joint swelling, myalgias, neck pain and neck stiffness.   Skin:  Negative for color change and rash.        No hair changes, no nail changes   Allergic/Immunologic: Negative for environmental allergies, food allergies and immunocompromised state.   Neurological:  Positive for headaches. Negative for dizziness, tremors, seizures, syncope, speech difficulty, weakness, light-headedness and numbness.   Hematological:  Negative for adenopathy. Does not bruise/bleed easily.   Psychiatric/Behavioral:  Negative for agitation,  "confusion, decreased concentration, dysphoric mood, sleep disturbance and suicidal ideas. The patient is not nervous/anxious.        Objective   Vitals:    04/23/25 0949   BP: 124/82   BP Location: Left arm   Patient Position: Sitting   Cuff Size: Large Adult   Pulse: 85   Temp: 97.8 °F (36.6 °C)   SpO2: 97%   Weight: 105 kg (232 lb 6.4 oz)   Height: 177.8 cm (70\")     Physical Exam  Constitutional:       General: She is not in acute distress.     Appearance: Normal appearance. She is well-developed. She is not ill-appearing or diaphoretic.   HENT:      Head: Normocephalic and atraumatic.      Right Ear: Hearing, tympanic membrane, ear canal and external ear normal.      Left Ear: Hearing, tympanic membrane, ear canal and external ear normal.      Nose: Nose normal. No nasal deformity, mucosal edema or rhinorrhea.      Right Sinus: No maxillary sinus tenderness or frontal sinus tenderness.      Left Sinus: No maxillary sinus tenderness or frontal sinus tenderness.      Mouth/Throat:      Mouth: Mucous membranes are moist.      Dentition: Normal dentition.      Pharynx: Oropharynx is clear.   Eyes:      General: Lids are normal.         Right eye: No discharge.         Left eye: No discharge.      Conjunctiva/sclera: Conjunctivae normal.      Right eye: No exudate.     Left eye: No exudate.  Neck:      Thyroid: No thyroid mass or thyromegaly.      Vascular: No carotid bruit.      Trachea: Trachea normal.   Cardiovascular:      Rate and Rhythm: Normal rate and regular rhythm.      Pulses: Normal pulses.      Heart sounds: Normal heart sounds. No murmur heard.  Pulmonary:      Effort: Pulmonary effort is normal. No respiratory distress.      Breath sounds: Normal breath sounds. No decreased breath sounds, wheezing, rhonchi or rales.   Abdominal:      General: Bowel sounds are normal.      Palpations: Abdomen is soft.      Tenderness: There is no abdominal tenderness.   Musculoskeletal:         General: No swelling or " tenderness.      Cervical back: Normal range of motion. No edema.   Lymphadenopathy:      Head:      Right side of head: No submental, submandibular, tonsillar, preauricular, posterior auricular or occipital adenopathy.      Left side of head: No submental, submandibular, tonsillar, preauricular, posterior auricular or occipital adenopathy.   Skin:     General: Skin is warm and dry.      Nails: There is no clubbing.   Neurological:      General: No focal deficit present.      Mental Status: She is alert and oriented to person, place, and time.   Psychiatric:         Mood and Affect: Mood normal.         Behavior: Behavior normal. Behavior is cooperative.         Judgment: Judgment normal.           Results  Labs   - Blood sugar: 01/13/2025, 149   - Kidney function: Normal   - Liver enzymes: Normal   - Sodium: Normal   - Potassium: Normal   - Blood count: Normal   - Cholesterol: 185   - LDL: 125    Imaging   - Mammogram: Normal       Assessment & Plan   Diagnoses and all orders for this visit:    1. Overactive bladder (Primary)  -     Discontinue: Vibegron (Gemtesa) 75 MG tablet; Take 1 tablet by mouth Daily.    2. Type 2 diabetes mellitus with hyperglycemia, without long-term current use of insulin  -     Hemoglobin A1c  -     Microalbumin / Creatinine Urine Ratio - Urine, Clean Catch    3. Mixed hyperlipidemia    4. Physical exam  -     CBC (No Diff)  -     Comprehensive Metabolic Panel  -     Lipid Panel  -     TSH    5. Screening for lung cancer  -      CT Chest Low Dose Cancer Screening WO; Future      Assessment & Plan    Health Maintenance.  - Mammogram results were within normal limits.  - CT scan screening for lung cancer will be ordered, as it is due in May.    Risk Assessment:  Family History   Problem Relation Age of Onset    Colonic polyp Mother 70    Hearing loss Mother     Ulcerative colitis Father     COPD Father     Diabetes Father     No Known Problems Brother     Diabetes Maternal Grandmother      Hearing loss Maternal Grandmother     Vision loss Maternal Grandmother     Kidney disease Maternal Uncle      Her Body mass index is 33.35 kg/m². Discussed a low-carb, low-sugar diet for improved diabetes control and overall health.    Prevention:  Health Maintenance   Topic Date Due    Hepatitis B (1 of 3 - 19+ 3-dose series) Never done    ZOSTER VACCINE (1 of 2) Never done    Pneumococcal Vaccine 50+ (3 of 3 - PCV20 or PCV21) 11/19/2023    ANNUAL PHYSICAL  03/20/2025    LUNG CANCER SCREENING  05/16/2025    DIABETIC FOOT EXAM  06/26/2025    INFLUENZA VACCINE  07/01/2025    HEMOGLOBIN A1C  10/23/2025    PAP SMEAR  01/27/2026    COLORECTAL CANCER SCREENING  02/11/2026    DIABETIC EYE EXAM  02/21/2026    LIPID PANEL  04/23/2026    URINE MICROALBUMIN-CREATININE RATIO (uACR)  04/23/2026    MAMMOGRAM  02/25/2027    TDAP/TD VACCINES (2 - Td or Tdap) 06/27/2031    HEPATITIS C SCREENING  Completed    COVID-19 Vaccine  Completed       Discussed healthy lifestyle choices such as maintaining a balanced diet low in carbohydrates and limiting caffeine and alcohol intake.  Recommended routine exercise for bone strength and cardiovascular health.         Patient or patient representative verbalized consent for the use of Ambient Listening during the visit with  RICHARD Joe for chart documentation. 5/6/2025  19:15 EDT

## 2025-05-06 NOTE — ASSESSMENT & PLAN NOTE
- Blood glucose levels have shown an increase, necessitating an escalation in Jardiance dosage.  - Reports no side effects from increase to 25 mg daily.  - A1c will be checked today to ensure it remains below 7.  - If A1c is above 7, further adjustments to the medication regimen will be considered.

## 2025-05-06 NOTE — ASSESSMENT & PLAN NOTE
- Samples of Gemtesa will be provided to assess its efficacy compared to Vesicare.  - Advised to discontinue Vesicare and commence Gemtesa for a duration of 3 weeks.  - If improvement is perceived with Gemtesa, instructed to contact the office for a prescription. If not beneficial, may revert to Vesicare.

## 2025-05-08 RX ORDER — ICOSAPENT ETHYL 1 G/1
CAPSULE ORAL
Qty: 360 CAPSULE | Refills: 1 | Status: SHIPPED | OUTPATIENT
Start: 2025-05-08

## 2025-05-08 NOTE — TELEPHONE ENCOUNTER
Rx Refill Note  Requested Prescriptions     Pending Prescriptions Disp Refills    icosapent ethyl (VASCEPA) 1 g capsule capsule [Pharmacy Med Name: ICOSAPENT CAP 1GM] 360 capsule 1     Sig: TAKE 2 CAPSULES TWO TIMES ADAY WITH MEALS.      Last office visit with prescribing clinician: 4/23/2025   Last telemedicine visit with prescribing clinician: Visit date not found   Next office visit with prescribing clinician: 7/30/2025                         Would you like a call back once the refill request has been completed: [] Yes [] No    If the office needs to give you a call back, can they leave a voicemail: [] Yes [] No    Jory Rice  05/08/25, 08:08 EDT

## 2025-05-20 ENCOUNTER — HOSPITAL ENCOUNTER (OUTPATIENT)
Dept: CT IMAGING | Facility: HOSPITAL | Age: 55
Discharge: HOME | End: 2025-05-20
Admitting: NURSE PRACTITIONER
Payer: COMMERCIAL

## 2025-05-20 DIAGNOSIS — Z12.2 SCREENING FOR LUNG CANCER: ICD-10-CM

## 2025-05-20 PROCEDURE — 71271 CT THORAX LUNG CANCER SCR C-: CPT

## 2025-05-27 DIAGNOSIS — Z72.0 TOBACCO USE: Primary | Chronic | ICD-10-CM

## 2025-05-27 DIAGNOSIS — R91.1 PULMONARY NODULE: ICD-10-CM

## 2025-06-14 DIAGNOSIS — E11.65 TYPE 2 DIABETES MELLITUS WITH HYPERGLYCEMIA, WITHOUT LONG-TERM CURRENT USE OF INSULIN: Chronic | ICD-10-CM

## 2025-06-16 RX ORDER — EMPAGLIFLOZIN 25 MG/1
25 TABLET, FILM COATED ORAL DAILY
Qty: 90 TABLET | Refills: 1 | Status: SHIPPED | OUTPATIENT
Start: 2025-06-16

## 2025-07-12 DIAGNOSIS — E78.2 MIXED HYPERLIPIDEMIA: ICD-10-CM

## 2025-07-12 DIAGNOSIS — E11.65 TYPE 2 DIABETES MELLITUS WITH HYPERGLYCEMIA, WITHOUT LONG-TERM CURRENT USE OF INSULIN: ICD-10-CM

## 2025-07-14 RX ORDER — FENOFIBRATE 145 MG/1
145 TABLET, FILM COATED ORAL
Qty: 90 TABLET | Refills: 1 | Status: SHIPPED | OUTPATIENT
Start: 2025-07-14

## 2025-07-30 ENCOUNTER — OFFICE VISIT (OUTPATIENT)
Dept: INTERNAL MEDICINE | Facility: CLINIC | Age: 55
End: 2025-07-30
Payer: COMMERCIAL

## 2025-07-30 VITALS
OXYGEN SATURATION: 96 % | DIASTOLIC BLOOD PRESSURE: 82 MMHG | HEIGHT: 70 IN | SYSTOLIC BLOOD PRESSURE: 134 MMHG | HEART RATE: 81 BPM | WEIGHT: 233 LBS | BODY MASS INDEX: 33.36 KG/M2

## 2025-07-30 DIAGNOSIS — E78.2 MIXED HYPERLIPIDEMIA: Chronic | ICD-10-CM

## 2025-07-30 DIAGNOSIS — Z23 NEED FOR VACCINATION FOR PNEUMOCOCCUS: ICD-10-CM

## 2025-07-30 DIAGNOSIS — Z72.0 TOBACCO USE: Chronic | ICD-10-CM

## 2025-07-30 DIAGNOSIS — E11.65 TYPE 2 DIABETES MELLITUS WITH HYPERGLYCEMIA, WITHOUT LONG-TERM CURRENT USE OF INSULIN: Primary | Chronic | ICD-10-CM

## 2025-07-30 LAB
ALBUMIN SERPL-MCNC: 4.6 G/DL (ref 3.5–5.2)
ALBUMIN/GLOB SERPL: 1.6 G/DL
ALP SERPL-CCNC: 43 U/L (ref 39–117)
ALT SERPL-CCNC: 17 U/L (ref 1–33)
AST SERPL-CCNC: 13 U/L (ref 1–32)
BILIRUB SERPL-MCNC: 0.3 MG/DL (ref 0–1.2)
BUN SERPL-MCNC: 16 MG/DL (ref 6–20)
BUN/CREAT SERPL: 25.8 (ref 7–25)
CALCIUM SERPL-MCNC: 10 MG/DL (ref 8.6–10.5)
CHLORIDE SERPL-SCNC: 102 MMOL/L (ref 98–107)
CO2 SERPL-SCNC: 21.6 MMOL/L (ref 22–29)
CREAT SERPL-MCNC: 0.62 MG/DL (ref 0.57–1)
EGFRCR SERPLBLD CKD-EPI 2021: 105.3 ML/MIN/1.73
GLOBULIN SER CALC-MCNC: 2.9 GM/DL
GLUCOSE SERPL-MCNC: 135 MG/DL (ref 65–99)
HBA1C MFR BLD: 7.3 % (ref 4.8–5.6)
POTASSIUM SERPL-SCNC: 4.5 MMOL/L (ref 3.5–5.2)
PROT SERPL-MCNC: 7.5 G/DL (ref 6–8.5)
SODIUM SERPL-SCNC: 137 MMOL/L (ref 136–145)

## 2025-08-03 NOTE — PROGRESS NOTES
"Chief Complaint  Diabetes and Hyperlipidemia  Subjective        Yu Hill presents to [facility]  History of Present Illness  History of Present Illness  The patient presents for f/u regarding DM2, HTN and hyperlipidemia.    She reports no current health concerns. She was recently switched to Gemtesa which she finds more effective than Vesicare for managing urinary symptoms and minimizing dry mouth. Sleep quality is good.    History of smoking since age 15, consuming a pack a day, no vaping. Received pneumonia vaccine in 2018.    On Jardiance 25 mg, metformin 4 tablets daily, and Ozempic 2 mg for diabetes management. No side effects, has had significant weight loss since starting these medications.    Also taking fenofibrate, Vascepa, and pravastatin for cholesterol management.    Objective   Vital Signs:  /82 (BP Location: Left arm, Patient Position: Sitting, Cuff Size: Adult)   Pulse 81   Ht 177.8 cm (70\")   Wt 106 kg (233 lb)   SpO2 96%   BMI 33.43 kg/m²   Estimated body mass index is 33.43 kg/m² as calculated from the following:    Height as of this encounter: 177.8 cm (70\").    Weight as of this encounter: 106 kg (233 lb).            Physical Exam  Constitutional:       Appearance: She is well-developed. She is not ill-appearing.   HENT:      Head: Normocephalic.      Right Ear: Hearing, tympanic membrane and external ear normal.      Left Ear: Hearing, tympanic membrane and external ear normal.      Nose: Nose normal. No nasal deformity, mucosal edema or rhinorrhea.      Right Sinus: No maxillary sinus tenderness or frontal sinus tenderness.      Left Sinus: No maxillary sinus tenderness or frontal sinus tenderness.      Mouth/Throat:      Dentition: Normal dentition.   Eyes:      General: Lids are normal.         Right eye: No discharge.         Left eye: No discharge.      Conjunctiva/sclera: Conjunctivae normal.      Right eye: No exudate.     Left eye: No " exudate.  Neck:      Thyroid: No thyroid mass or thyromegaly.      Vascular: No carotid bruit.      Trachea: Trachea normal.   Cardiovascular:      Rate and Rhythm: Regular rhythm.      Pulses: Normal pulses.           Dorsalis pedis pulses are 2+ on the right side and 2+ on the left side.        Posterior tibial pulses are 2+ on the right side and 2+ on the left side.      Heart sounds: Normal heart sounds. No murmur heard.  Pulmonary:      Effort: No respiratory distress.      Breath sounds: Normal breath sounds. No decreased breath sounds, wheezing, rhonchi or rales.   Abdominal:      General: Bowel sounds are normal.      Palpations: Abdomen is soft.      Tenderness: There is no abdominal tenderness.   Musculoskeletal:      Cervical back: Normal range of motion. No edema.      Right foot: Normal range of motion.      Left foot: Normal range of motion.   Feet:      Right foot:      Protective Sensation: 10 sites tested.  10 sites sensed.      Skin integrity: Skin integrity normal. No ulcer, blister or skin breakdown.      Toenail Condition: Right toenails are normal.      Left foot:      Protective Sensation: 10 sites tested.  10 sites sensed.      Skin integrity: Skin integrity normal. No ulcer, blister or skin breakdown.      Toenail Condition: Left toenails are normal.      Comments: Diabetic Foot Exam Performed and Monofilament Test Performed    Lymphadenopathy:      Head:      Right side of head: No submental, submandibular, tonsillar, preauricular, posterior auricular or occipital adenopathy.      Left side of head: No submental, submandibular, tonsillar, preauricular, posterior auricular or occipital adenopathy.   Skin:     General: Skin is warm and dry.      Nails: There is no clubbing.   Neurological:      Mental Status: She is alert.   Psychiatric:         Behavior: Behavior is cooperative.            Result Review :  The following data was reviewed by: RICHARD Joe on 07/30/2025:  Common labs           1/13/2025    09:23 4/23/2025    11:42 7/30/2025    09:21   Common Labs   Glucose 149  110  135    BUN 13  15  16.0    Creatinine 0.58  0.64  0.62    Sodium 139  138  137    Potassium 4.4  4.5  4.5    Chloride 103  102  102    Calcium 9.6  10.0  10.0    Albumin 4.5  4.5  4.6    Total Bilirubin 0.3  0.4  0.3    Alkaline Phosphatase 41  39  43    AST (SGOT) 10  12  13    ALT (SGPT) 16  16  17    WBC 9.19  9.5     Hemoglobin 14.5  15.4     Hematocrit 44.6  46.4     Platelets 281  309     Total Cholesterol 185  158     Triglycerides 113  120     HDL Cholesterol 39  37     LDL Cholesterol  125  99     Hemoglobin A1C 7.30  7.2  7.30    Microalbumin, Urine  <3.0       Data reviewed : Radiologic studies CT chest 5/20/25     Results  Laboratory Studies  A1c 7.2. LDL cholesterol 99. Triglycerides 120. Thyroid function normal. Blood count normal. Urine protein normal.    Imaging  Lung cancer screening showed a new spot on the right upper lobe measuring 7 mm.             Assessment and Plan   Diagnoses and all orders for this visit:    1. Type 2 diabetes mellitus with hyperglycemia, without long-term current use of insulin (Primary)  Assessment & Plan:  A1c 7.2, slightly above target of <7. It was 7.3 in January. On Jardiance 25 mg, metformin (4 tablets daily), and Ozempic 2 mg. Weight loss since starting Ozempic, no side effects. If today's A1c remains >7, medication adjustments will be considered. Blood work today to assess A1c, kidney, and liver function.    Orders:  -     Hemoglobin A1c  -     Comprehensive Metabolic Panel    2. Mixed hyperlipidemia  Assessment & Plan:  LDL 99 in April, improved from 125. Triglycerides 120, down from 300s. On fenofibrate, Vascepa, and pravastatin. Continued lipid profile monitoring recommended.      3. Tobacco use  Assessment & Plan:  New nodule on right upper lobe measuring 7 mm identified in May. Follow-up screening scheduled for August 27.        4. Need for vaccination for  pneumococcus  -     Pneumococcal Conjugate Vaccine 21 (18+ yrs)      Assessment & Plan    Health Maintenance.  Mammogram in February, lung cancer screening in May.   Thyroid function and blood count normal as of April 23. Urine protein levels normal, indicating good kidney function.   Will receive Prevnar vaccine today due to increased risk from diabetes.         Follow Up   Return in about 3 months (around 10/30/2025).  Patient was given instructions and counseling regarding her condition or for health maintenance advice. Please see specific information pulled into the AVS if appropriate.     Patient or patient representative verbalized consent for the use of Ambient Listening during the visit with  RICHARD Joe for chart documentation. 8/3/2025  06:53 EDT

## 2025-08-03 NOTE — ASSESSMENT & PLAN NOTE
A1c 7.2, slightly above target of <7. It was 7.3 in January. On Jardiance 25 mg, metformin (4 tablets daily), and Ozempic 2 mg. Weight loss since starting Ozempic, no side effects. If today's A1c remains >7, medication adjustments will be considered. Blood work today to assess A1c, kidney, and liver function.

## 2025-08-03 NOTE — ASSESSMENT & PLAN NOTE
LDL 99 in April, improved from 125. Triglycerides 120, down from 300s. On fenofibrate, Vascepa, and pravastatin. Continued lipid profile monitoring recommended.

## 2025-08-03 NOTE — ASSESSMENT & PLAN NOTE
New nodule on right upper lobe measuring 7 mm identified in May. Follow-up screening scheduled for August 27.

## 2025-08-21 DIAGNOSIS — E11.65 TYPE 2 DIABETES MELLITUS WITH HYPERGLYCEMIA, WITHOUT LONG-TERM CURRENT USE OF INSULIN: Primary | Chronic | ICD-10-CM

## 2025-08-26 DIAGNOSIS — E11.65 TYPE 2 DIABETES MELLITUS WITH HYPERGLYCEMIA, WITHOUT LONG-TERM CURRENT USE OF INSULIN: Chronic | ICD-10-CM

## 2025-08-27 ENCOUNTER — HOSPITAL ENCOUNTER (OUTPATIENT)
Dept: CT IMAGING | Facility: HOSPITAL | Age: 55
Discharge: HOME | End: 2025-08-27
Admitting: NURSE PRACTITIONER
Payer: COMMERCIAL

## 2025-08-27 DIAGNOSIS — Z72.0 TOBACCO USE: Chronic | ICD-10-CM

## 2025-08-27 DIAGNOSIS — R91.1 PULMONARY NODULE: ICD-10-CM

## 2025-08-27 PROCEDURE — 71250 CT THORAX DX C-: CPT

## 2025-08-27 RX ORDER — LISINOPRIL 2.5 MG/1
2.5 TABLET ORAL DAILY
Qty: 90 TABLET | Refills: 1 | Status: SHIPPED | OUTPATIENT
Start: 2025-08-27